# Patient Record
Sex: MALE | Race: WHITE | NOT HISPANIC OR LATINO | Employment: OTHER | URBAN - METROPOLITAN AREA
[De-identification: names, ages, dates, MRNs, and addresses within clinical notes are randomized per-mention and may not be internally consistent; named-entity substitution may affect disease eponyms.]

---

## 2018-03-01 ENCOUNTER — PES CALL (OUTPATIENT)
Dept: ADMINISTRATIVE | Facility: CLINIC | Age: 68
End: 2018-03-01

## 2018-08-21 ENCOUNTER — PES CALL (OUTPATIENT)
Dept: ADMINISTRATIVE | Facility: CLINIC | Age: 68
End: 2018-08-21

## 2020-03-09 ENCOUNTER — PES CALL (OUTPATIENT)
Dept: ADMINISTRATIVE | Facility: CLINIC | Age: 70
End: 2020-03-09

## 2020-11-27 ENCOUNTER — PES CALL (OUTPATIENT)
Dept: ADMINISTRATIVE | Facility: CLINIC | Age: 70
End: 2020-11-27

## 2020-11-30 ENCOUNTER — PES CALL (OUTPATIENT)
Dept: ADMINISTRATIVE | Facility: CLINIC | Age: 70
End: 2020-11-30

## 2021-02-27 ENCOUNTER — IMMUNIZATION (OUTPATIENT)
Dept: INTERNAL MEDICINE | Facility: CLINIC | Age: 71
End: 2021-02-27
Payer: MEDICARE

## 2021-02-27 DIAGNOSIS — Z23 NEED FOR VACCINATION: Primary | ICD-10-CM

## 2021-02-27 PROCEDURE — 91300 COVID-19, MRNA, LNP-S, PF, 30 MCG/0.3 ML DOSE VACCINE: CPT | Mod: PBBFAC | Performed by: FAMILY MEDICINE

## 2021-03-20 ENCOUNTER — IMMUNIZATION (OUTPATIENT)
Dept: INTERNAL MEDICINE | Facility: CLINIC | Age: 71
End: 2021-03-20
Payer: MEDICARE

## 2021-03-20 DIAGNOSIS — Z23 NEED FOR VACCINATION: Primary | ICD-10-CM

## 2021-03-20 PROCEDURE — 0002A COVID-19, MRNA, LNP-S, PF, 30 MCG/0.3 ML DOSE VACCINE: CPT | Mod: PBBFAC | Performed by: FAMILY MEDICINE

## 2021-03-20 PROCEDURE — 91300 COVID-19, MRNA, LNP-S, PF, 30 MCG/0.3 ML DOSE VACCINE: CPT | Mod: PBBFAC | Performed by: FAMILY MEDICINE

## 2021-12-09 ENCOUNTER — IMMUNIZATION (OUTPATIENT)
Dept: INTERNAL MEDICINE | Facility: CLINIC | Age: 71
End: 2021-12-09
Payer: MEDICARE

## 2021-12-09 DIAGNOSIS — Z23 NEED FOR VACCINATION: Primary | ICD-10-CM

## 2021-12-09 PROCEDURE — 0004A COVID-19, MRNA, LNP-S, PF, 30 MCG/0.3 ML DOSE VACCINE: CPT | Mod: HCNC,PBBFAC | Performed by: FAMILY MEDICINE

## 2023-08-22 ENCOUNTER — PES CALL (OUTPATIENT)
Dept: ADMINISTRATIVE | Facility: CLINIC | Age: 73
End: 2023-08-22
Payer: MEDICARE

## 2024-01-07 ENCOUNTER — HOSPITAL ENCOUNTER (INPATIENT)
Facility: HOSPITAL | Age: 74
LOS: 3 days | Discharge: HOME | DRG: 871 | End: 2024-01-10
Attending: EMERGENCY MEDICINE | Admitting: HOSPITALIST
Payer: MEDICARE

## 2024-01-07 DIAGNOSIS — E87.20 LACTIC ACIDOSIS: ICD-10-CM

## 2024-01-07 DIAGNOSIS — M89.8X9 BONE MASS: ICD-10-CM

## 2024-01-07 DIAGNOSIS — K29.60 EMPHYSEMATOUS GASTRITIS: ICD-10-CM

## 2024-01-07 DIAGNOSIS — K85.90 ACUTE PANCREATITIS, UNSPECIFIED COMPLICATION STATUS, UNSPECIFIED PANCREATITIS TYPE: ICD-10-CM

## 2024-01-07 DIAGNOSIS — C16.9 MALIGNANT NEOPLASM OF STOMACH, UNSPECIFIED LOCATION: Primary | ICD-10-CM

## 2024-01-07 DIAGNOSIS — K25.0 ACUTE GASTRIC ULCER WITH HEMORRHAGE: ICD-10-CM

## 2024-01-07 DIAGNOSIS — R06.02 SHORTNESS OF BREATH: ICD-10-CM

## 2024-01-07 DIAGNOSIS — R55 SYNCOPE, UNSPECIFIED SYNCOPE TYPE: ICD-10-CM

## 2024-01-07 DIAGNOSIS — K92.0 HEMATEMESIS WITH NAUSEA: ICD-10-CM

## 2024-01-07 DIAGNOSIS — J43.2 CENTRILOBULAR EMPHYSEMA: ICD-10-CM

## 2024-01-07 DIAGNOSIS — Z72.0 TOBACCO ABUSE: ICD-10-CM

## 2024-01-07 DIAGNOSIS — D64.9 ANEMIA, UNSPECIFIED TYPE: ICD-10-CM

## 2024-01-07 PROBLEM — A41.9 SEPSIS: Status: ACTIVE | Noted: 2024-01-07

## 2024-01-07 PROBLEM — R91.8 LUNG NODULES: Status: ACTIVE | Noted: 2024-01-07

## 2024-01-07 PROBLEM — R63.4 WEIGHT LOSS: Status: ACTIVE | Noted: 2024-01-07

## 2024-01-07 PROBLEM — J43.9 EMPHYSEMA LUNG: Status: ACTIVE | Noted: 2024-01-07

## 2024-01-07 PROBLEM — R73.9 HYPERGLYCEMIA: Status: ACTIVE | Noted: 2024-01-07

## 2024-01-07 PROBLEM — I95.9 HYPOTENSION: Status: ACTIVE | Noted: 2024-01-07

## 2024-01-07 LAB
ABO + RH BLD: NORMAL
ALBUMIN SERPL BCP-MCNC: 2.1 G/DL (ref 3.5–5.2)
ALBUMIN SERPL BCP-MCNC: 2.3 G/DL (ref 3.5–5.2)
ALLENS TEST: ABNORMAL
ALP SERPL-CCNC: 49 U/L (ref 55–135)
ALP SERPL-CCNC: 59 U/L (ref 55–135)
ALT SERPL W/O P-5'-P-CCNC: 10 U/L (ref 10–44)
ALT SERPL W/O P-5'-P-CCNC: 5 U/L (ref 10–44)
AMPHET+METHAMPHET UR QL: NEGATIVE
ANION GAP SERPL CALC-SCNC: 19 MMOL/L (ref 8–16)
ANION GAP SERPL CALC-SCNC: 7 MMOL/L (ref 8–16)
AST SERPL-CCNC: 10 U/L (ref 10–40)
AST SERPL-CCNC: 14 U/L (ref 10–40)
BARBITURATES UR QL SCN>200 NG/ML: NEGATIVE
BASOPHILS # BLD AUTO: 0.03 K/UL (ref 0–0.2)
BASOPHILS # BLD AUTO: 0.04 K/UL (ref 0–0.2)
BASOPHILS # BLD AUTO: 0.04 K/UL (ref 0–0.2)
BASOPHILS NFR BLD: 0.2 % (ref 0–1.9)
BASOPHILS NFR BLD: 0.2 % (ref 0–1.9)
BASOPHILS NFR BLD: 0.3 % (ref 0–1.9)
BENZODIAZ UR QL SCN>200 NG/ML: NEGATIVE
BILIRUB SERPL-MCNC: 0.2 MG/DL (ref 0.1–1)
BILIRUB SERPL-MCNC: 0.3 MG/DL (ref 0.1–1)
BILIRUB UR QL STRIP: NEGATIVE
BLD GP AB SCN CELLS X3 SERPL QL: NORMAL
BNP SERPL-MCNC: 26 PG/ML (ref 0–99)
BUN SERPL-MCNC: 25 MG/DL (ref 8–23)
BUN SERPL-MCNC: 36 MG/DL (ref 8–23)
BZE UR QL SCN: NEGATIVE
CALCIUM SERPL-MCNC: 8.1 MG/DL (ref 8.7–10.5)
CALCIUM SERPL-MCNC: 8.6 MG/DL (ref 8.7–10.5)
CANCER AG19-9 SERPL-ACNC: 5.8 U/ML (ref 0–40)
CANNABINOIDS UR QL SCN: NEGATIVE
CHLORIDE SERPL-SCNC: 103 MMOL/L (ref 95–110)
CHLORIDE SERPL-SCNC: 106 MMOL/L (ref 95–110)
CK SERPL-CCNC: 23 U/L (ref 20–200)
CLARITY UR: CLEAR
CO2 SERPL-SCNC: 13 MMOL/L (ref 23–29)
CO2 SERPL-SCNC: 19 MMOL/L (ref 23–29)
COLOR UR: YELLOW
CREAT SERPL-MCNC: 0.9 MG/DL (ref 0.5–1.4)
CREAT SERPL-MCNC: 1.2 MG/DL (ref 0.5–1.4)
CREAT UR-MCNC: 62.8 MG/DL (ref 23–375)
CTP QC/QA: YES
CTP QC/QA: YES
D DIMER PPP IA.FEU-MCNC: 3.51 MG/L FEU
DIFFERENTIAL METHOD BLD: ABNORMAL
EOSINOPHIL # BLD AUTO: 0 K/UL (ref 0–0.5)
EOSINOPHIL # BLD AUTO: 0 K/UL (ref 0–0.5)
EOSINOPHIL # BLD AUTO: 0.1 K/UL (ref 0–0.5)
EOSINOPHIL NFR BLD: 0.1 % (ref 0–8)
EOSINOPHIL NFR BLD: 0.1 % (ref 0–8)
EOSINOPHIL NFR BLD: 0.7 % (ref 0–8)
ERYTHROCYTE [DISTWIDTH] IN BLOOD BY AUTOMATED COUNT: 15.9 % (ref 11.5–14.5)
EST. GFR  (NO RACE VARIABLE): >60 ML/MIN/1.73 M^2
EST. GFR  (NO RACE VARIABLE): >60 ML/MIN/1.73 M^2
ETHANOL SERPL-MCNC: <10 MG/DL
FERRITIN SERPL-MCNC: 121 NG/ML (ref 20–300)
FIO2: 100 %
FOLATE SERPL-MCNC: 2.2 NG/ML (ref 4–24)
GLUCOSE SERPL-MCNC: 115 MG/DL (ref 70–110)
GLUCOSE SERPL-MCNC: 286 MG/DL (ref 70–110)
GLUCOSE UR QL STRIP: NEGATIVE
HCT VFR BLD AUTO: 24.9 % (ref 40–54)
HCT VFR BLD AUTO: 27.7 % (ref 40–54)
HCT VFR BLD AUTO: 30.6 % (ref 40–54)
HGB BLD-MCNC: 10 G/DL (ref 14–18)
HGB BLD-MCNC: 8.2 G/DL (ref 14–18)
HGB BLD-MCNC: 9.2 G/DL (ref 14–18)
HGB UR QL STRIP: NEGATIVE
IMM GRANULOCYTES # BLD AUTO: 0.1 K/UL (ref 0–0.04)
IMM GRANULOCYTES # BLD AUTO: 0.16 K/UL (ref 0–0.04)
IMM GRANULOCYTES # BLD AUTO: 0.36 K/UL (ref 0–0.04)
IMM GRANULOCYTES NFR BLD AUTO: 0.7 % (ref 0–0.5)
IMM GRANULOCYTES NFR BLD AUTO: 0.9 % (ref 0–0.5)
IMM GRANULOCYTES NFR BLD AUTO: 2.7 % (ref 0–0.5)
IRON SERPL-MCNC: 70 UG/DL (ref 45–160)
KETONES UR QL STRIP: NEGATIVE
LACTATE SERPL-SCNC: 2.1 MMOL/L (ref 0.5–2.2)
LACTATE SERPL-SCNC: 2.7 MMOL/L (ref 0.5–2.2)
LACTATE SERPL-SCNC: 9.9 MMOL/L (ref 0.5–2.2)
LEUKOCYTE ESTERASE UR QL STRIP: NEGATIVE
LIPASE SERPL-CCNC: 38 U/L (ref 4–60)
LPM: 15
LYMPHOCYTES # BLD AUTO: 1.2 K/UL (ref 1–4.8)
LYMPHOCYTES # BLD AUTO: 2.1 K/UL (ref 1–4.8)
LYMPHOCYTES # BLD AUTO: 3.4 K/UL (ref 1–4.8)
LYMPHOCYTES NFR BLD: 15.1 % (ref 18–48)
LYMPHOCYTES NFR BLD: 25 % (ref 18–48)
LYMPHOCYTES NFR BLD: 6.4 % (ref 18–48)
MAGNESIUM SERPL-MCNC: 2.2 MG/DL (ref 1.6–2.6)
MCH RBC QN AUTO: 30.3 PG (ref 27–31)
MCH RBC QN AUTO: 30.4 PG (ref 27–31)
MCH RBC QN AUTO: 30.4 PG (ref 27–31)
MCHC RBC AUTO-ENTMCNC: 32.7 G/DL (ref 32–36)
MCHC RBC AUTO-ENTMCNC: 32.9 G/DL (ref 32–36)
MCHC RBC AUTO-ENTMCNC: 33.2 G/DL (ref 32–36)
MCV RBC AUTO: 91 FL (ref 82–98)
MCV RBC AUTO: 92 FL (ref 82–98)
MCV RBC AUTO: 93 FL (ref 82–98)
METHADONE UR QL SCN>300 NG/ML: NEGATIVE
MONOCYTES # BLD AUTO: 0.7 K/UL (ref 0.3–1)
MONOCYTES # BLD AUTO: 0.9 K/UL (ref 0.3–1)
MONOCYTES # BLD AUTO: 0.9 K/UL (ref 0.3–1)
MONOCYTES NFR BLD: 4.6 % (ref 4–15)
MONOCYTES NFR BLD: 5.1 % (ref 4–15)
MONOCYTES NFR BLD: 6.4 % (ref 4–15)
NEUTROPHILS # BLD AUTO: 10.7 K/UL (ref 1.8–7.7)
NEUTROPHILS # BLD AUTO: 16.2 K/UL (ref 1.8–7.7)
NEUTROPHILS # BLD AUTO: 8.8 K/UL (ref 1.8–7.7)
NEUTROPHILS NFR BLD: 64.9 % (ref 38–73)
NEUTROPHILS NFR BLD: 78.8 % (ref 38–73)
NEUTROPHILS NFR BLD: 87.8 % (ref 38–73)
NITRITE UR QL STRIP: NEGATIVE
NRBC BLD-RTO: 0 /100 WBC
OPIATES UR QL SCN: NEGATIVE
PCO2 BLDA: 31.5 MMHG (ref 35–45)
PCP UR QL SCN>25 NG/ML: NEGATIVE
PH SMN: 7.31 [PH] (ref 7.35–7.45)
PH UR STRIP: 6 [PH] (ref 5–8)
PLATELET # BLD AUTO: 257 K/UL (ref 150–450)
PLATELET # BLD AUTO: 261 K/UL (ref 150–450)
PLATELET # BLD AUTO: 343 K/UL (ref 150–450)
PMV BLD AUTO: 9.2 FL (ref 9.2–12.9)
PMV BLD AUTO: 9.3 FL (ref 9.2–12.9)
PMV BLD AUTO: 9.4 FL (ref 9.2–12.9)
PO2 BLDA: 292 MMHG (ref 80–100)
POC BASE DEFICIT: -9.5 MMOL/L (ref -2–2)
POC HCO3: 15.8 MMOL/L (ref 24–28)
POC MOLECULAR INFLUENZA A AGN: NEGATIVE
POC MOLECULAR INFLUENZA B AGN: NEGATIVE
POC PERFORMED BY: ABNORMAL
POC SATURATED O2: 100 % (ref 95–100)
POCT GLUCOSE: 100 MG/DL (ref 70–110)
POCT GLUCOSE: 117 MG/DL (ref 70–110)
POTASSIUM SERPL-SCNC: 4.1 MMOL/L (ref 3.5–5.1)
POTASSIUM SERPL-SCNC: 5.1 MMOL/L (ref 3.5–5.1)
PROCALCITONIN SERPL IA-MCNC: 0.07 NG/ML
PROT SERPL-MCNC: 4.3 G/DL (ref 6–8.4)
PROT SERPL-MCNC: 4.9 G/DL (ref 6–8.4)
PROT UR QL STRIP: ABNORMAL
RBC # BLD AUTO: 2.7 M/UL (ref 4.6–6.2)
RBC # BLD AUTO: 3.04 M/UL (ref 4.6–6.2)
RBC # BLD AUTO: 3.29 M/UL (ref 4.6–6.2)
SARS-COV-2 RDRP RESP QL NAA+PROBE: NEGATIVE
SATURATED IRON: 31 % (ref 20–50)
SODIUM SERPL-SCNC: 132 MMOL/L (ref 136–145)
SODIUM SERPL-SCNC: 135 MMOL/L (ref 136–145)
SP GR UR STRIP: >1.03 (ref 1–1.03)
SPECIMEN OUTDATE: NORMAL
SPECIMEN SOURCE: ABNORMAL
TOTAL IRON BINDING CAPACITY: 223 UG/DL (ref 250–450)
TOXICOLOGY INFORMATION: NORMAL
TRANSFERRIN SERPL-MCNC: 151 MG/DL (ref 200–375)
TROPONIN I SERPL DL<=0.01 NG/ML-MCNC: 0.02 NG/ML (ref 0–0.03)
TSH SERPL DL<=0.005 MIU/L-ACNC: 2.37 UIU/ML (ref 0.4–4)
URN SPEC COLLECT METH UR: ABNORMAL
UROBILINOGEN UR STRIP-ACNC: NEGATIVE EU/DL
VIT B12 SERPL-MCNC: 229 PG/ML (ref 210–950)
WBC # BLD AUTO: 13.53 K/UL (ref 3.9–12.7)
WBC # BLD AUTO: 13.64 K/UL (ref 3.9–12.7)
WBC # BLD AUTO: 18.45 K/UL (ref 3.9–12.7)

## 2024-01-07 PROCEDURE — 83605 ASSAY OF LACTIC ACID: CPT | Mod: 91,HCNC | Performed by: EMERGENCY MEDICINE

## 2024-01-07 PROCEDURE — 25000003 PHARM REV CODE 250: Mod: HCNC | Performed by: EMERGENCY MEDICINE

## 2024-01-07 PROCEDURE — 99900035 HC TECH TIME PER 15 MIN (STAT): Mod: HCNC

## 2024-01-07 PROCEDURE — C9113 INJ PANTOPRAZOLE SODIUM, VIA: HCPCS | Mod: HCNC | Performed by: HOSPITALIST

## 2024-01-07 PROCEDURE — 83605 ASSAY OF LACTIC ACID: CPT | Mod: 91,HCNC | Performed by: STUDENT IN AN ORGANIZED HEALTH CARE EDUCATION/TRAINING PROGRAM

## 2024-01-07 PROCEDURE — 25000003 PHARM REV CODE 250: Mod: HCNC | Performed by: HOSPITALIST

## 2024-01-07 PROCEDURE — C9113 INJ PANTOPRAZOLE SODIUM, VIA: HCPCS | Mod: HCNC | Performed by: STUDENT IN AN ORGANIZED HEALTH CARE EDUCATION/TRAINING PROGRAM

## 2024-01-07 PROCEDURE — 96365 THER/PROPH/DIAG IV INF INIT: CPT | Mod: HCNC

## 2024-01-07 PROCEDURE — P9021 RED BLOOD CELLS UNIT: HCPCS | Mod: HCNC | Performed by: STUDENT IN AN ORGANIZED HEALTH CARE EDUCATION/TRAINING PROGRAM

## 2024-01-07 PROCEDURE — 93005 ELECTROCARDIOGRAM TRACING: CPT | Mod: HCNC

## 2024-01-07 PROCEDURE — 84484 ASSAY OF TROPONIN QUANT: CPT | Mod: HCNC | Performed by: EMERGENCY MEDICINE

## 2024-01-07 PROCEDURE — 80053 COMPREHEN METABOLIC PANEL: CPT | Mod: 91,HCNC | Performed by: STUDENT IN AN ORGANIZED HEALTH CARE EDUCATION/TRAINING PROGRAM

## 2024-01-07 PROCEDURE — 93010 ELECTROCARDIOGRAM REPORT: CPT | Mod: HCNC,,, | Performed by: INTERNAL MEDICINE

## 2024-01-07 PROCEDURE — 36600 WITHDRAWAL OF ARTERIAL BLOOD: CPT | Mod: HCNC

## 2024-01-07 PROCEDURE — 86920 COMPATIBILITY TEST SPIN: CPT | Mod: HCNC | Performed by: STUDENT IN AN ORGANIZED HEALTH CARE EDUCATION/TRAINING PROGRAM

## 2024-01-07 PROCEDURE — 86301 IMMUNOASSAY TUMOR CA 19-9: CPT | Mod: HCNC | Performed by: STUDENT IN AN ORGANIZED HEALTH CARE EDUCATION/TRAINING PROGRAM

## 2024-01-07 PROCEDURE — 83880 ASSAY OF NATRIURETIC PEPTIDE: CPT | Mod: HCNC | Performed by: EMERGENCY MEDICINE

## 2024-01-07 PROCEDURE — 63600175 PHARM REV CODE 636 W HCPCS: Mod: HCNC | Performed by: HOSPITALIST

## 2024-01-07 PROCEDURE — 36415 COLL VENOUS BLD VENIPUNCTURE: CPT | Mod: HCNC | Performed by: STUDENT IN AN ORGANIZED HEALTH CARE EDUCATION/TRAINING PROGRAM

## 2024-01-07 PROCEDURE — 25500020 PHARM REV CODE 255: Mod: HCNC | Performed by: EMERGENCY MEDICINE

## 2024-01-07 PROCEDURE — 87635 SARS-COV-2 COVID-19 AMP PRB: CPT | Mod: HCNC | Performed by: EMERGENCY MEDICINE

## 2024-01-07 PROCEDURE — 87040 BLOOD CULTURE FOR BACTERIA: CPT | Mod: HCNC | Performed by: EMERGENCY MEDICINE

## 2024-01-07 PROCEDURE — 80053 COMPREHEN METABOLIC PANEL: CPT | Mod: HCNC | Performed by: EMERGENCY MEDICINE

## 2024-01-07 PROCEDURE — 86850 RBC ANTIBODY SCREEN: CPT | Mod: HCNC | Performed by: HOSPITALIST

## 2024-01-07 PROCEDURE — 83690 ASSAY OF LIPASE: CPT | Mod: HCNC | Performed by: EMERGENCY MEDICINE

## 2024-01-07 PROCEDURE — A9698 NON-RAD CONTRAST MATERIALNOC: HCPCS | Mod: HCNC | Performed by: EMERGENCY MEDICINE

## 2024-01-07 PROCEDURE — 82803 BLOOD GASES ANY COMBINATION: CPT | Mod: HCNC

## 2024-01-07 PROCEDURE — 82746 ASSAY OF FOLIC ACID SERUM: CPT | Mod: HCNC | Performed by: STUDENT IN AN ORGANIZED HEALTH CARE EDUCATION/TRAINING PROGRAM

## 2024-01-07 PROCEDURE — 96361 HYDRATE IV INFUSION ADD-ON: CPT | Mod: HCNC

## 2024-01-07 PROCEDURE — 84145 PROCALCITONIN (PCT): CPT | Mod: HCNC | Performed by: EMERGENCY MEDICINE

## 2024-01-07 PROCEDURE — 82550 ASSAY OF CK (CPK): CPT | Mod: HCNC | Performed by: EMERGENCY MEDICINE

## 2024-01-07 PROCEDURE — 63600175 PHARM REV CODE 636 W HCPCS: Mod: HCNC | Performed by: STUDENT IN AN ORGANIZED HEALTH CARE EDUCATION/TRAINING PROGRAM

## 2024-01-07 PROCEDURE — 63600175 PHARM REV CODE 636 W HCPCS: Mod: HCNC | Performed by: EMERGENCY MEDICINE

## 2024-01-07 PROCEDURE — 94761 N-INVAS EAR/PLS OXIMETRY MLT: CPT | Mod: HCNC,XB

## 2024-01-07 PROCEDURE — 27000221 HC OXYGEN, UP TO 24 HOURS: Mod: HCNC

## 2024-01-07 PROCEDURE — 99223 1ST HOSP IP/OBS HIGH 75: CPT | Mod: HCNC,,, | Performed by: INTERNAL MEDICINE

## 2024-01-07 PROCEDURE — 94640 AIRWAY INHALATION TREATMENT: CPT | Mod: HCNC

## 2024-01-07 PROCEDURE — 85025 COMPLETE CBC W/AUTO DIFF WBC: CPT | Mod: 91,HCNC | Performed by: HOSPITALIST

## 2024-01-07 PROCEDURE — 85379 FIBRIN DEGRADATION QUANT: CPT | Mod: HCNC | Performed by: EMERGENCY MEDICINE

## 2024-01-07 PROCEDURE — 99285 EMERGENCY DEPT VISIT HI MDM: CPT | Mod: 25,HCNC

## 2024-01-07 PROCEDURE — 83540 ASSAY OF IRON: CPT | Mod: HCNC | Performed by: STUDENT IN AN ORGANIZED HEALTH CARE EDUCATION/TRAINING PROGRAM

## 2024-01-07 PROCEDURE — 83735 ASSAY OF MAGNESIUM: CPT | Mod: HCNC | Performed by: EMERGENCY MEDICINE

## 2024-01-07 PROCEDURE — 82077 ASSAY SPEC XCP UR&BREATH IA: CPT | Mod: HCNC | Performed by: EMERGENCY MEDICINE

## 2024-01-07 PROCEDURE — 80307 DRUG TEST PRSMV CHEM ANLYZR: CPT | Mod: HCNC | Performed by: EMERGENCY MEDICINE

## 2024-01-07 PROCEDURE — 82728 ASSAY OF FERRITIN: CPT | Mod: HCNC | Performed by: STUDENT IN AN ORGANIZED HEALTH CARE EDUCATION/TRAINING PROGRAM

## 2024-01-07 PROCEDURE — 84443 ASSAY THYROID STIM HORMONE: CPT | Mod: HCNC | Performed by: EMERGENCY MEDICINE

## 2024-01-07 PROCEDURE — 25000242 PHARM REV CODE 250 ALT 637 W/ HCPCS: Mod: HCNC | Performed by: HOSPITALIST

## 2024-01-07 PROCEDURE — 20000000 HC ICU ROOM: Mod: HCNC

## 2024-01-07 PROCEDURE — 81003 URINALYSIS AUTO W/O SCOPE: CPT | Mod: HCNC,59 | Performed by: EMERGENCY MEDICINE

## 2024-01-07 PROCEDURE — 82607 VITAMIN B-12: CPT | Mod: HCNC | Performed by: STUDENT IN AN ORGANIZED HEALTH CARE EDUCATION/TRAINING PROGRAM

## 2024-01-07 PROCEDURE — 85025 COMPLETE CBC W/AUTO DIFF WBC: CPT | Mod: HCNC | Performed by: EMERGENCY MEDICINE

## 2024-01-07 PROCEDURE — 36430 TRANSFUSION BLD/BLD COMPNT: CPT | Mod: HCNC

## 2024-01-07 RX ORDER — HYDROCODONE BITARTRATE AND ACETAMINOPHEN 500; 5 MG/1; MG/1
TABLET ORAL
Status: DISCONTINUED | OUTPATIENT
Start: 2024-01-07 | End: 2024-01-10 | Stop reason: HOSPADM

## 2024-01-07 RX ORDER — IBUPROFEN 200 MG
200 TABLET ORAL EVERY 6 HOURS PRN
Status: ON HOLD | COMMUNITY
End: 2024-01-10 | Stop reason: HOSPADM

## 2024-01-07 RX ORDER — IPRATROPIUM BROMIDE AND ALBUTEROL SULFATE 2.5; .5 MG/3ML; MG/3ML
3 SOLUTION RESPIRATORY (INHALATION)
Status: DISPENSED | OUTPATIENT
Start: 2024-01-07 | End: 2024-01-09

## 2024-01-07 RX ORDER — SODIUM CHLORIDE 9 MG/ML
INJECTION, SOLUTION INTRAVENOUS
Status: DISCONTINUED | OUTPATIENT
Start: 2024-01-07 | End: 2024-01-10 | Stop reason: HOSPADM

## 2024-01-07 RX ORDER — GLUCAGON 1 MG
1 KIT INJECTION
Status: DISCONTINUED | OUTPATIENT
Start: 2024-01-07 | End: 2024-01-09

## 2024-01-07 RX ORDER — INSULIN ASPART 100 [IU]/ML
0-5 INJECTION, SOLUTION INTRAVENOUS; SUBCUTANEOUS EVERY 6 HOURS PRN
Status: DISCONTINUED | OUTPATIENT
Start: 2024-01-07 | End: 2024-01-09

## 2024-01-07 RX ORDER — PANTOPRAZOLE SODIUM 40 MG/10ML
40 INJECTION, POWDER, LYOPHILIZED, FOR SOLUTION INTRAVENOUS 2 TIMES DAILY
Status: DISCONTINUED | OUTPATIENT
Start: 2024-01-07 | End: 2024-01-10 | Stop reason: HOSPADM

## 2024-01-07 RX ORDER — SODIUM CHLORIDE 9 MG/ML
1000 INJECTION, SOLUTION INTRAVENOUS
Status: COMPLETED | OUTPATIENT
Start: 2024-01-07 | End: 2024-01-07

## 2024-01-07 RX ORDER — IBUPROFEN 200 MG
1 TABLET ORAL DAILY
Status: DISCONTINUED | OUTPATIENT
Start: 2024-01-07 | End: 2024-01-10 | Stop reason: HOSPADM

## 2024-01-07 RX ORDER — PANTOPRAZOLE SODIUM 40 MG/10ML
40 INJECTION, POWDER, LYOPHILIZED, FOR SOLUTION INTRAVENOUS ONCE
Status: COMPLETED | OUTPATIENT
Start: 2024-01-07 | End: 2024-01-07

## 2024-01-07 RX ADMIN — PANTOPRAZOLE SODIUM 40 MG: 40 INJECTION, POWDER, LYOPHILIZED, FOR SOLUTION INTRAVENOUS at 05:01

## 2024-01-07 RX ADMIN — VANCOMYCIN HYDROCHLORIDE 2250 MG: 500 INJECTION, POWDER, LYOPHILIZED, FOR SOLUTION INTRAVENOUS at 03:01

## 2024-01-07 RX ADMIN — PIPERACILLIN AND TAZOBACTAM 4.5 G: 4; .5 INJECTION, POWDER, LYOPHILIZED, FOR SOLUTION INTRAVENOUS; PARENTERAL at 11:01

## 2024-01-07 RX ADMIN — SODIUM CHLORIDE: 9 INJECTION, SOLUTION INTRAVENOUS at 03:01

## 2024-01-07 RX ADMIN — SODIUM CHLORIDE 1000 ML: 9 INJECTION, SOLUTION INTRAVENOUS at 09:01

## 2024-01-07 RX ADMIN — IOHEXOL 100 ML: 350 INJECTION, SOLUTION INTRAVENOUS at 10:01

## 2024-01-07 RX ADMIN — PANTOPRAZOLE SODIUM 40 MG: 40 INJECTION, POWDER, LYOPHILIZED, FOR SOLUTION INTRAVENOUS at 01:01

## 2024-01-07 RX ADMIN — PANTOPRAZOLE SODIUM 40 MG: 40 INJECTION, POWDER, LYOPHILIZED, FOR SOLUTION INTRAVENOUS at 10:01

## 2024-01-07 RX ADMIN — PIPERACILLIN AND TAZOBACTAM 4.5 G: 4; .5 INJECTION, POWDER, LYOPHILIZED, FOR SOLUTION INTRAVENOUS; PARENTERAL at 07:01

## 2024-01-07 RX ADMIN — IOHEXOL 75 ML: 350 INJECTION, SOLUTION INTRAVENOUS at 02:01

## 2024-01-07 RX ADMIN — IPRATROPIUM BROMIDE AND ALBUTEROL SULFATE 3 ML: 2.5; .5 SOLUTION RESPIRATORY (INHALATION) at 09:01

## 2024-01-07 RX ADMIN — IOHEXOL 1000 ML: 12 SOLUTION ORAL at 01:01

## 2024-01-07 RX ADMIN — IPRATROPIUM BROMIDE AND ALBUTEROL SULFATE 3 ML: 2.5; .5 SOLUTION RESPIRATORY (INHALATION) at 03:01

## 2024-01-07 RX ADMIN — SODIUM CHLORIDE, POTASSIUM CHLORIDE, SODIUM LACTATE AND CALCIUM CHLORIDE 2859 ML: 600; 310; 30; 20 INJECTION, SOLUTION INTRAVENOUS at 09:01

## 2024-01-07 NOTE — CONSULTS
[facility] Note    Primary Attending Physician: Dr. Hinojosa  ICU Attending: Dr. Vasquez    Reason for Consult:     Hypotension    Subjective:      History of Present Illness:  Pino Nance is a 73 y.o. male with no documented past medical history presenting to the ICU with hypotension, generalized weakness, and abdominal pain. The patient presented to the [facility] ED on 1/7/2024 after experiencing profound weakness and had an episode of hematemesis while having a loose bowel movement earlier today. Per EMS, his BP in the field was 74/40 and his O2 saturation was 82% on non-rebreather. He has not had a primary care provider in the last 40 years.     He began experiencing constant diffuse abdominal pain 3-4 months ago along with a bad taste in his mouth and subsequent loss of appetite. Additionally, he has also experience a 50 lb weight loss over the course of a few months. He denies any prior episodes of hematemesis before today.    The patient has a 116 pack/year (2 pack per day) smoking history. He decreased to 1PPD last year. Currently smokes 1 pack per day. He reports chronic SOB and coughs up brown sputum daily. He denies any use of alcohol, marijuana, cocaine or opiates. Notes no use of NSAIDS.    Past Medical History:  History reviewed. No pertinent past medical history.    Past Surgical History:  Appendectomy at 9 y/o    Allergies:  Review of patient's allergies indicates:  No Known Allergies    Medications:   In-Hospital Scheduled Medications:   albuterol-ipratropium  3 mL Nebulization Q6H WAKE    nicotine  1 patch Transdermal Daily    pantoprazole  40 mg Intravenous BID    piperacillin-tazobactam (Zosyn) IV (PEDS and ADULTS) (extended infusion is not appropriate)  4.5 g Intravenous Q8H    vancomycin (VANCOCIN) IV (PEDS and ADULTS)  2,250 mg Intravenous Once    [START ON 1/8/2024] vancomycin (VANCOCIN) IV (PEDS and ADULTS)  2,500 mg Intravenous Q24H      In-Hospital PRN  "Medications:  sodium chloride 0.9%, dextrose 10%, dextrose 10%, glucagon (human recombinant), insulin aspart U-100, Pharmacy to dose Vancomycin consult **AND** vancomycin - pharmacy to dose   In-Hospital IV Infusion Medications:     Home Medications:  Prior to Admission medications    Medication Sig Start Date End Date Taking? Authorizing Provider   ibuprofen (ADVIL,MOTRIN) 200 MG tablet Take 200 mg by mouth every 6 (six) hours as needed for Pain.   Yes Provider, Historical       Family History:  Family History   Family history unknown: Yes       Social History:  Social History     Tobacco Use    Smoking status: Every Day     Current packs/day: 1.00     Average packs/day: 2 pack/day for 58.0 years (116.0 ttl pk-yrs)     Types: Cigarettes     Start date:    Substance Use Topics    Alcohol use: Not Currently    Drug use: Never       Review of Systems:  Constitutional: negative  Eyes: negative  Respiratory: positive for SOB  Cardiovascular: negative  Gastrointestinal: positive for abdominal pain, diarrhea, nausea, reflux symptoms, and vomiting  Neurological: negative All other systems are reviewed and are negative.     Objective:   Last 24 Hour Vital Signs:  BP  Min: 74/51  Max: 126/50  Temp  Av.4 °F (36.3 °C)  Min: 96.4 °F (35.8 °C)  Max: 98.1 °F (36.7 °C)  Pulse  Av.4  Min: 78  Max: 102  Resp  Av.1  Min: 16  Max: 36  SpO2  Av.2 %  Min: 90 %  Max: 100 %  Height  Av' 11.5" (181.6 cm)  Min: 5' 11" (180.3 cm)  Max: 6' (182.9 cm)  Weight  Av.6 kg (204 lb 3.3 oz)  Min: 90 kg (198 lb 6.6 oz)  Max: 95.3 kg (210 lb)  No intake/output data recorded.    Physical Examination:  BP (!) 93/56   Pulse 79   Temp 98.1 °F (36.7 °C) (Oral)   Resp (!) 28   Ht 5' 11" (1.803 m)   Wt 90 kg (198 lb 6.6 oz)   SpO2 98%   BMI 27.67 kg/m²   General appearance: alert, appears stated age, and cooperative  Head: Normocephalic, without obvious abnormality, atraumatic  Eyes: conjunctivae/corneas clear. PERRL, " "EOM's intact  Neck: no JVD and supple, symmetrical, trachea midline  Back: no kyphosis present, no scoliosis present  Lungs:  coarse breath sounds bilaterally  Chest wall: no tenderness, barrel chested  Heart: regular rate and rhythm  Abdomen: abnormal findings:  diffuse tenderness to palpation mainly in the epigastric regions  Extremities: extremities normal, atraumatic, no cyanosis or edema      Laboratory:  Trended Lab Data:  Recent Labs     01/07/24 0928 01/07/24  1323   WBC 13.53* 18.45*   HGB 10.0* 9.2*   HCT 30.6* 27.7*    261   *  --    K 4.1  --      --    CO2 13*  --    BUN 25*  --    CREATININE 1.2  --    *  --    BILITOT 0.3  --    AST 10  --    ALT 5*  --    ALKPHOS 59  --    CALCIUM 8.6*  --    ALBUMIN 2.3*  --    PROT 4.9*  --    MG 2.2  --          Cardiac:   Recent Labs   Lab 01/07/24 0928   TROPONINI 0.016   BNP 26         Urinalysis: No results found for: "LABURIN", "COLORU", "CLARITYU", "SPECGRAV", "LABSPEC", "NITRITE", "PROTEINUR", "GLUCOSEU", "KETONESU", "UROBILINOGEN", "BILIRUBINUR", "BLOODU"    Microbiology:  Microbiology Results (last 7 days)       Procedure Component Value Units Date/Time    Blood Culture #1 **CANNOT BE ORDERED STAT** [[id number]] Collected: 01/07/24 0936    Order Status: Sent Specimen: Blood from Peripheral, Hand, Left Updated: 01/07/24 0936    Blood Culture #2 **CANNOT BE ORDERED STAT** [[id number]] Collected: 01/07/24 0930    Order Status: Sent Specimen: Blood from Peripheral, Antecubital, Right Updated: 01/07/24 0931            Radiology:  CXR: No acute findings    CT PE: Unremarkable    CT Chest: Ground glass appearance. Emphysematous changes in the bilateral lungs, most notably at the apices. Possible air in the stomach wall consistent with emphysematous gastritis. Notable stranding of the fat along the posterior in the inferior margin of the stomach that extends towards the pancreas.    CT Abdomen: Findings suggestive of a >5 cm lesser " curvature gastric ulcer with erosion into and inflammation of the adjacent pancreatic tail/pancreatitis.  Blood products are suspected within the gastric lumen. Aortic aneurysm dilation with eccentric mural thrombus.    I have personally reviewed the above labs and imaging.    Current Medications:     Infusions:       Scheduled:   albuterol-ipratropium  3 mL Nebulization Q6H WAKE    nicotine  1 patch Transdermal Daily    pantoprazole  40 mg Intravenous BID    piperacillin-tazobactam (Zosyn) IV (PEDS and ADULTS) (extended infusion is not appropriate)  4.5 g Intravenous Q8H    vancomycin (VANCOCIN) IV (PEDS and ADULTS)  2,250 mg Intravenous Once    [START ON 1/8/2024] vancomycin (VANCOCIN) IV (PEDS and ADULTS)  2,500 mg Intravenous Q24H        PRN:  sodium chloride 0.9%, dextrose 10%, dextrose 10%, glucagon (human recombinant), insulin aspart U-100, Pharmacy to dose Vancomycin consult **AND** vancomycin - pharmacy to dose     Assessment:     Pino Nance is a 73 y.o. male presenting with a case of hypotension and anion gap metabolic acidosis secondary to acute gastric ulcer bleed and possible pancreatitis in the setting of tobacco use disorder and possible gastric or pancreatic malignancy.     Plan:     Neuro:  No acute concerns  - AAOx3, cooperative and calm    Cardiovascular/Hemodynamics:  Hemorrhagic shock   Acute gastric ulcer bleed  Suspected sepsis  - CBC and imaging indicate a bleeding gastric ulcer as the likely source of his blood loss and resulting hypovolemia. Also likely responsible for elevated D-dimer. BP stabilized after fluid administration in the ED. All other vitals stable.  - Last ABG 7.309/31.5/15.8; anion gap = 23 (0900 1/7/24)  - Lactic acid level down trending 9.9 (0930) --> 2.7 (1323)  - WBC count elevated to 18.5 this afternoon.   - Blood cultures collected and started on vancomycin and zosyn.     Plan:  -EGD tomorrow to attempt bleeding control and evaluate the need for surgical  management  -Repeat Hgb. Typed and crossed.   - Goal of transfusion in hemorrhagic shock is reversal of hypoperfusion, not merely Hgb >7.  -Recommend de-escalation of antibiotics as no infectious source for MRSA is suspected, can de-escalate to zosyn alone.     Respiratory:   Probable COPD  Tobacco Use Disorder  - Patient's history of daily brown phlegm production, physical exam, emphysematous changes on CXR, and tobacco history are suggestive of newly diagnosed COPD.  - O2 saturations within normal limits at this time. Breathing at baseline effort.    Plan  - DuoNebs Q6  - PFTs, 6MWT on discharge   -  regarding smoking cessation    GI/FEN:  F: Euvolemic at this time  E: at goal; K>4, Mg>2  N: NPO for EGD. Optimize nutrition as soon as possible post-procedure.    Gastric ulcer bleed  Pancreatitis  Possible GI malignancy  - Gastric ulcer bleed seen on imaging. Meets 2/3 criteria for pancreatitis. Recent weight loss also concerning for possible gastric or pancreatic malignancy    Plan:  - EGD tomorrow  - NPO at this time. Optimize nutrition after EGD.    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 1.2, unknown basline  - Renally dose all medication, avoid nephrotoxic agents  - Strict I/Os, daily weights    Heme/Onc:   Possible gastric vs pancreatic malignancy   Symptomatic Anemia  - WBC 18.45, Hgb 9.2  - Biposy of gastric ulcer will be performed    Endocrine:  Hyperglycemia  - A1c pending  - SSI + Accuchecks  - Goal glucose 140-180 while in ICU    TSH: 2.372    Rheum/MSK:  No acute concerns     Critical Care Daily Checklist:    A: Awake: RASS Goal/Actual Goal: 0  Actual: Dickson Agitation Sedation Scale (RASS): 0   B: Spontaneous Breathing Trial Performed? N/A   C:                        D: Delirium: CAM-ICU Overall CAM-ICU: N/A   E: Early Mobility Performed? No    F: Feeding Goal: Regular diet  Status: NPO pre-procedure     AS: Analgesia/Sedation None   T: Thromboembolic Prophylaxis None   H: HOB > 300 Yes   U: Stress Ulcer Prophylaxis (if needed) N/A   G: Glucose Control SSI   B: Bowel Function Stool Occurrence: 1   I: Indwelling Catheter (Lines & [place]) Necessity No   D: De-escalation of Antimicrobials/Pharmacotherapies Yes    Plan for the day/ETD NPO overnight, EGD in the AM    Code Status:  Family/Goals of Care: Full code     Roro Goyal MD  [facility] EM/IM  [facility]

## 2024-01-07 NOTE — CONSULTS
[facility] - Intensive Care  Gastroenterology  Consult Note    Patient Name: Pino Nance  MRN: [id number]  Admission Date: 1/7/2024  Hospital Length of Stay: 0 days  Code Status: Full Code   Attending Provider: Davin Hinojosa,*   Consulting Provider: Kelly Joyce MD  Primary Care Physician: Karla Luevano MD  Principal Problem:Hypotension    [facility]  Consult performed by: Kelly Joyce MD  Consult ordered by: Davin Hinojosa MD  Reason for consult: Hematemesis, abnormal imaging GI  Assessment/Recommendations: Please see A/P below        Subjective:     HPI:  73-year-old man with tobacco use disorder who presents with episode of hematemesis and lightheadedness.  He states that he has not seen a doctor in almost 40 years and is on no home medications including NSAIDs (chart review shows colonoscopy for rectal bleeding with Dr. Harper 2012, as well as numerous visits at [facility] over the past 10 years for orthopedic issues) .  He states that over the past several months he has been having stomach pain associated with decreased appetite.  Also reports the food does not taste as good as normal and that he has had a bad taste in his mouth that stays there over this time.  States that he used to be a size 52 waist but that now his pants are no longer fitting.  He does not weigh himself so unsure how much weight he has lost.     History reviewed. No pertinent past medical history.    History reviewed. No pertinent surgical history.    Review of patient's allergies indicates:  No Known Allergies  Family History    Family history is unknown by patient.       Tobacco Use    Smoking status: Every Day     Current packs/day: 1.00     Average packs/day: 1 pack/day for 58.0 years (58.0 ttl pk-yrs)     Types: Cigarettes     Start date: 1966    Smokeless tobacco: Not on file   Substance and Sexual Activity    Alcohol use: Not Currently    Drug use: Never    Sexual  activity: Not on file     Review of Systems   Constitutional:  Positive for appetite change and unexpected weight change.   Eyes:  Negative for photophobia and visual disturbance.   Respiratory:  Negative for chest tightness, shortness of breath and wheezing.    Cardiovascular:  Negative for chest pain, palpitations and leg swelling.   Gastrointestinal:  Positive for abdominal pain and vomiting.   Genitourinary:  Negative for dysuria, flank pain and hematuria.   Musculoskeletal:  Negative for joint swelling and myalgias.   Skin:  Negative for color change and rash.   Neurological:  Negative for dizziness and speech difficulty.   Psychiatric/Behavioral:  Negative for confusion and hallucinations.      Objective:     Vital Signs (Most Recent):  Temp: 97.6 °F (36.4 °C) (01/07/24 1405)  Pulse: 82 (01/07/24 1415)  Resp: (!) 26 (01/07/24 1415)  BP: (!) 93/55 (01/07/24 1415)  SpO2: 99 % (01/07/24 1415) Vital Signs (24h Range):  Temp:  [96.4 °F (35.8 °C)-97.6 °F (36.4 °C)] 97.6 °F (36.4 °C)  Pulse:  [] 82  Resp:  [17-36] 26  SpO2:  [90 %-100 %] 99 %  BP: ()/(40-69) 93/55     Weight: 90 kg (198 lb 6.6 oz) (01/07/24 1405)  Body mass index is 27.67 kg/m².      Intake/Output Summary (Last 24 hours) at 1/7/2024 1453  Last data filed at 1/7/2024 1224  Gross per 24 hour   Intake 1101.38 ml   Output --   Net 1101.38 ml       Lines/Drains/Airways       Peripheral Intravenous Line  Duration                  Peripheral IV - Single Lumen 01/07/24 0945 20 G Anterior;Left Antecubital <1 day         Peripheral IV - Single Lumen 01/07/24 0949 20 G Right Antecubital <1 day         Peripheral IV - Single Lumen 01/07/24 0950 20 G Anterior;Right Hand <1 day                     Physical Exam  Constitutional:       Appearance: Normal appearance. He is well-developed. He is ill-appearing.   HENT:      Head: Normocephalic and atraumatic.      Mouth/Throat:      Comments: Very poor dentition  Eyes:      Extraocular Movements:  "Extraocular movements intact.      Pupils: Pupils are equal, round, and reactive to light.   Pulmonary:      Effort: Pulmonary effort is normal. No respiratory distress.   Abdominal:      General: There is no distension.      Palpations: Abdomen is soft.      Tenderness: There is no abdominal tenderness. There is no guarding or rebound.   Musculoskeletal:         General: No deformity. Normal range of motion.      Cervical back: Normal range of motion and neck supple.   Skin:     General: Skin is warm and dry.   Neurological:      General: No focal deficit present.      Mental Status: He is alert and oriented to person, place, and time.   Psychiatric:         Mood and Affect: Mood normal.         Behavior: Behavior normal.          Significant Labs:  Blood Culture: No results for input(s): "LABBLOO" in the last 48 hours.  CBC:   Recent Labs   Lab 01/07/24  0928 01/07/24  1323   WBC 13.53* 18.45*   HGB 10.0* 9.2*   HCT 30.6* 27.7*    261     BMP:   Recent Labs   Lab 01/07/24  0928   *   *   K 4.1      CO2 13*   BUN 25*   CREATININE 1.2   CALCIUM 8.6*   MG 2.2     CMP:   Recent Labs   Lab 01/07/24  0928   *   CALCIUM 8.6*   ALBUMIN 2.3*   PROT 4.9*   *   K 4.1   CO2 13*      BUN 25*   CREATININE 1.2   ALKPHOS 59   ALT 5*   AST 10   BILITOT 0.3     Coagulation: No results for input(s): "PT", "INR", "APTT" in the last 48 hours.    Significant Imaging:  Imaging results within the past 24 hours have been reviewed. Large mass in the proximal stomach.    Assessment/Plan:     GI  Hematemesis with nausea  - Large mass seen in stomach on CT scan.  Exam benign, no overt perforation or peritonitis  - NPO  - bid PPI  - Plan for EGD in am        Thank you for your consult. The GI team will follow-up with patient. Please contact us if you have any additional questions.    Kelly Joyce MD  Gastroenterology  [facility] - Intensive [facility]  "

## 2024-01-07 NOTE — MEDICAL/APP STUDENT
[facility] Note    Primary Attending Physician: Dr. Hinojosa  ICU Attending: Dr. Vasquez    Reason for Consult:     Hypotension    Subjective:      History of Present Illness:  Pino Nance is a 73 y.o. male with no documented past medical history presenting to the ICU with hypotension, generalized weakness, and abdominal pain. The patient presented to the [facility] ED on 1/7/2024 after experiencing profound weakness and had an episode of hematemesis while having a loose bowel movement earlier today. Per EMS, his BP in the field was 74/40 and his O2 saturation was 82% on non-rebreather. He has not seen a physician in the last 40 years.     He began experiencing constant diffuse abdominal pain 3-4 months ago along with a bad taste in his mouth and subsequent loss of appetite. Additionally, he has also experience a 50 lb weight loss over the course of a few months. He denies any prior episodes of hematemesis before today.    The patient has a 116 pack/year (2 pack per day) smoking history. He currently smoked 1 pack per day. He reports chronic SOB and coughs up brown sputum daily. He denies any alcohol, marijuana, cocaine or opiates.    Past Medical History:  History reviewed. No pertinent past medical history.    Past Surgical History:  Appendectomy at 11 y/o    Allergies:  Review of patient's allergies indicates:  No Known Allergies    Medications:   In-Hospital Scheduled Medications:   albuterol-ipratropium  3 mL Nebulization Q6H WAKE    nicotine  1 patch Transdermal Daily    pantoprazole  40 mg Intravenous BID    pantoprazole  40 mg Intravenous Once    piperacillin-tazobactam (Zosyn) IV (PEDS and ADULTS) (extended infusion is not appropriate)  4.5 g Intravenous Q8H    vancomycin (VANCOCIN) IV (PEDS and ADULTS)  2,250 mg Intravenous Once    [START ON 1/8/2024] vancomycin (VANCOCIN) IV (PEDS and ADULTS)  2,500 mg Intravenous Q24H      In-Hospital PRN Medications:  sodium chloride  "0.9%, dextrose 10%, dextrose 10%, glucagon (human recombinant), insulin aspart U-100, Pharmacy to dose Vancomycin consult **AND** vancomycin - pharmacy to dose   In-Hospital IV Infusion Medications:     Home Medications:  Prior to Admission medications    Medication Sig Start Date End Date Taking? Authorizing Provider   ibuprofen (ADVIL,MOTRIN) 200 MG tablet Take 200 mg by mouth every 6 (six) hours as needed for Pain.   Yes Provider, Historical       Family History:  Family History   Family history unknown: Yes       Social History:  Social History     Tobacco Use    Smoking status: Every Day     Current packs/day: 1.00     Average packs/day: 2 pack/day for 58.0 years (116.0 ttl pk-yrs)     Types: Cigarettes     Start date:    Substance Use Topics    Alcohol use: Not Currently    Drug use: Never       Review of Systems:  Constitutional: negative  Eyes: negative  Respiratory: positive for SOB  Cardiovascular: negative  Gastrointestinal: positive for abdominal pain, diarrhea, nausea, reflux symptoms, and vomiting  Neurological: negative All other systems are reviewed and are negative.     Objective:   Last 24 Hour Vital Signs:  BP  Min: 74/51  Max: 126/50  Temp  Av °F (36.1 °C)  Min: 96.4 °F (35.8 °C)  Max: 97.6 °F (36.4 °C)  Pulse  Av.2  Min: 78  Max: 102  Resp  Av.5  Min: 16  Max: 36  SpO2  Av.3 %  Min: 90 %  Max: 100 %  Height  Av' 11.5" (181.6 cm)  Min: 5' 11" (180.3 cm)  Max: 6' (182.9 cm)  Weight  Av.6 kg (204 lb 3.3 oz)  Min: 90 kg (198 lb 6.6 oz)  Max: 95.3 kg (210 lb)  No intake/output data recorded.    Physical Examination:  BP (!) 91/58   Pulse 79   Temp 97.6 °F (36.4 °C) (Oral)   Resp 19   Ht 5' 11" (1.803 m)   Wt 90 kg (198 lb 6.6 oz)   SpO2 (!) 93%   BMI 27.67 kg/m²   General appearance: alert, appears stated age, and cooperative  Head: Normocephalic, without obvious abnormality, atraumatic  Eyes: conjunctivae/corneas clear. PERRL, EOM's intact  Neck: no JVD and " "supple, symmetrical, trachea midline  Back: no kyphosis present, no scoliosis present  Lungs:  coarse breath sounds bilaterally  Chest wall: no tenderness, barrel chested  Heart: regular rate and rhythm  Abdomen: abnormal findings:  diffuse tenderness to palpation mainly in the epigastric regions  Extremities: extremities normal, atraumatic, no cyanosis or edema      Laboratory:  Trended Lab Data:  Recent Labs     01/07/24 0928 01/07/24  1323   WBC 13.53* 18.45*   HGB 10.0* 9.2*   HCT 30.6* 27.7*    261   *  --    K 4.1  --      --    CO2 13*  --    BUN 25*  --    CREATININE 1.2  --    *  --    BILITOT 0.3  --    AST 10  --    ALT 5*  --    ALKPHOS 59  --    CALCIUM 8.6*  --    ALBUMIN 2.3*  --    PROT 4.9*  --    MG 2.2  --          Cardiac:   Recent Labs   Lab 01/07/24 0928   TROPONINI 0.016   BNP 26         Urinalysis: No results found for: "LABURIN", "COLORU", "CLARITYU", "SPECGRAV", "LABSPEC", "NITRITE", "PROTEINUR", "GLUCOSEU", "KETONESU", "UROBILINOGEN", "BILIRUBINUR", "BLOODU"    Microbiology:  Microbiology Results (last 7 days)       Procedure Component Value Units Date/Time    Blood Culture #1 **CANNOT BE ORDERED STAT** [[id number]] Collected: 01/07/24 0936    Order Status: Sent Specimen: Blood from Peripheral, Hand, Left Updated: 01/07/24 0936    Blood Culture #2 **CANNOT BE ORDERED STAT** [[id number]] Collected: 01/07/24 0930    Order Status: Sent Specimen: Blood from Peripheral, Antecubital, Right Updated: 01/07/24 0931            Radiology:  CXR: No acute findings    CT PE: Unremarkable    CT Chest: Ground glass appearance. Emphysematous changes in the bilateral lungs, most notably at the apices. Possible air in the stomach wall consistent with emphysematous gastritis. Notable stranding of the fat along the posterior in the inferior margin of the stomach that extends towards the pancreas.    CT Abdomen: Findings suggestive of a >5 cm lesser curvature gastric ulcer with " erosion into and inflammation of the adjacent pancreatic tail/pancreatitis.  Blood products are suspected within the gastric lumen. Aortic aneurysm dilation with eccentric mural thrombus.    I have personally reviewed the above labs and imaging.    Current Medications:     Infusions:       Scheduled:   albuterol-ipratropium  3 mL Nebulization Q6H WAKE    nicotine  1 patch Transdermal Daily    pantoprazole  40 mg Intravenous BID    pantoprazole  40 mg Intravenous Once    piperacillin-tazobactam (Zosyn) IV (PEDS and ADULTS) (extended infusion is not appropriate)  4.5 g Intravenous Q8H    vancomycin (VANCOCIN) IV (PEDS and ADULTS)  2,250 mg Intravenous Once    [START ON 1/8/2024] vancomycin (VANCOCIN) IV (PEDS and ADULTS)  2,500 mg Intravenous Q24H        PRN:  sodium chloride 0.9%, dextrose 10%, dextrose 10%, glucagon (human recombinant), insulin aspart U-100, Pharmacy to dose Vancomycin consult **AND** vancomycin - pharmacy to dose     Assessment:     Pino Nance is a 73 y.o. male presenting with a case of hypotension and anion gap metabolic acidosis secondary to acute gastric ulcer bleed and possible pancreatitis in the setting of tobacco use disorder and possible gastric or pancreatic malignancy.     Plan:     Neuro:  No acute concerns  - AAOx3, cooperative and calm    Cardiovascular/Hemodynamics:  Hemorrhagic shock   Acute gastric ulcer bleed  Suspected sepsis  - CBC and imaging indicate a bleeding gastric ulcer as the likely source of his blood loss and resulting hypovolemia. Also likely responsible for elevated D-dimer. BP stabilized after fluid administration in the ED. All other vitals stable.  - Last ABG 7.309/31.5/15.8; anion gap = 23 (0900 1/7/24)  - Lactic acid level down trending 9.9 (0930) --> 2.7 (1323)  - WBC count elevated to 18.5 this afternoon.   - Blood cultures collected and started on vancomycin and zosyn.     Plan:  -EGD tomorrow to attempt bleeding control and evaluate the need for  surgical management  -Repeat Hgb. Typed and crossed.   - Goal of transfusion in hemorrhagic shock is reversal of hypoperfusion, not merely Hgb >7.  -Recommend de-escalation of antibiotics as no infectious source for MRSA is suspected, can de-escalate to zosyn alone.     Respiratory:   Probable COPD  Tobacco Use Disorder  - Patient's history of daily brown phlegm production, physical exam, emphysematous changes on CXR, and tobacco history are suggestive of newly diagnosed COPD.  - O2 saturations within normal limits at this time. Breathing at baseline effort.    Plan  - DuoNebs Q6  - PFTs, 6MWT on discharge   -  regarding smoking cessation    GI/FEN:  F: Euvolemic at this time  E: at goal; K>4, Mg>2  N: NPO for EGD. Optimize nutrition as soon as possible post-procedure.    Gastric ulcer bleed  Pancreatitis  Possible GI malignancy  - Gastric ulcer bleed seen on imaging. Meets 2/3 criteria for pancreatitis. Recent weight loss also concerning for possible gastric or pancreatic malignancy    Plan:  - EGD tomorrow  - NPO at this time. Optimize nutrition after EGD.    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 1.2, unknown basline  - Renally dose all medication, avoid nephrotoxic agents  - Strict I/Os, daily weights    Heme/Onc:   Possible gastric vs pancreatic malignancy   Symptomatic Anemia  - WBC 18.45, Hgb 9.2  - Biposy of gastric ulcer will be performed    Endocrine:  Hyperglycemia  - A1c pending  - SSI + Accuchecks  - Goal glucose 140-180 while in ICU    TSH: 2.372    Rheum/MSK:  No acute concerns     Critical Care Daily Checklist:    A: Awake: RASS Goal/Actual Goal: 0  Actual: Dickson Agitation Sedation Scale (RASS): 0   B: Spontaneous Breathing Trial Performed? N/A   C:                        D: Delirium: CAM-ICU Overall CAM-ICU: N/A   E: Early Mobility  Performed? No   F: Feeding Goal: Regular diet  Status: NPO pre-procedure     AS: Analgesia/Sedation None   T: Thromboembolic Prophylaxis None   H: HOB > 300 Yes   U: Stress Ulcer Prophylaxis (if needed) N/A   G: Glucose Control SSI   B: Bowel Function Stool Occurrence: 1   I: Indwelling Catheter (Lines & Malave) Necessity No   D: De-escalation of Antimicrobials/Pharmacotherapies Yes    Plan for the day/ETD NPO overnight, EGD in the AM    Code Status:  Family/Goals of Care: Full code     Roro Goyal MD  [facility] EM/IM  [facility]

## 2024-01-07 NOTE — PROGRESS NOTES
"Pharmacokinetic Initial Assessment: IV Vancomycin    Assessment/Plan:    Initiate intravenous vancomycin with loading dose of 2250 mg (25 mg/kg) once followed by a maintenance dose of vancomycin 2500mg IV every 24 hours  Desired empiric serum trough concentration is 10 to 20 mcg/mL  Draw vancomycin trough level 60 min prior to third dose on 1/9 at approximately 1430  Pharmacy will continue to follow and monitor vancomycin.      Please contact pharmacy at extension [phone] with any questions regarding this assessment.     Thank you for the consult,   Bobby Craft, PharmD, BCCCP       Patient brief summary:  Pino Nance is a 73 y.o. male initiated on antimicrobial therapy with IV Vancomycin for treatment of suspected intra-abdominal infection    Drug Allergies:   Review of patient's allergies indicates:  No Known Allergies    Actual Body Weight:   90 kg    Renal Function:   Estimated Creatinine Clearance: 60.1 mL/min (based on SCr of 1.2 mg/dL).    Dialysis Method (if applicable):  N/A    CBC (last 72 hours):  Recent Labs   Lab Result Units 01/07/24  0928 01/07/24  1323   WBC K/uL 13.53* 18.45*   Hemoglobin g/dL 10.0* 9.2*   Hematocrit % 30.6* 27.7*   Platelets K/uL 343 261   Gran % % 64.9 87.8*   Lymph % % 25.0 6.4*   Mono % % 6.4 4.6   Eosinophil % % 0.7 0.1   Basophil % % 0.3 0.2   Differential Method  Automated Automated       Metabolic Panel (last 72 hours):  Recent Labs   Lab Result Units 01/07/24  0928   Sodium mmol/L 135*   Potassium mmol/L 4.1   Chloride mmol/L 103   CO2 mmol/L 13*   Glucose mg/dL 286*   BUN mg/dL 25*   Creatinine mg/dL 1.2   Albumin g/dL 2.3*   Total Bilirubin mg/dL 0.3   Alkaline Phosphatase U/L 59   AST U/L 10   ALT U/L 5*   Magnesium mg/dL 2.2       Drug levels (last 3 results):  No results for input(s): "VANCOMYCINRA", "VANCORANDOM", "VANCOMYCINPE", "VANCOPEAK", "VANCOMYCINTR", "VANCOTROUGH" in the last 72 hours.    Microbiologic Results:  Microbiology Results (last 7 days)       " Procedure Component Value Units Date/Time    Blood Culture #1 **CANNOT BE ORDERED STAT** [[id number]] Collected: 01/07/24 0936    Order Status: Sent Specimen: Blood from Peripheral, Hand, Left Updated: 01/07/24 0936    Blood Culture #2 **CANNOT BE ORDERED STAT** [[id number]] Collected: 01/07/24 0930    Order Status: Sent Specimen: Blood from Peripheral, Antecubital, Right Updated: 01/07/24 0931

## 2024-01-07 NOTE — ASSESSMENT & PLAN NOTE
- Large mass seen in stomach on CT scan.  Exam benign, no overt perforation or peritonitis  - NPO  - bid PPI  - Plan for EGD in am

## 2024-01-07 NOTE — MEDICAL/APP STUDENT
[facility] Note    Primary Attending Physician: Dr. Hinojosa  ICU Attending: Dr. Vasquez    Reason for Consult:     Hypotension    Subjective:      History of Present Illness:  Pino Nance is a 73 y.o. male with no documented past medical history presents to the ICU with hypotension, generalized weakness, and abdominal pain. The patient presented to the [facility] ED on 1/7/2024 after experiencing profound weakness and had an episode of hematemesis while having a BM. Per EMS, his BP in the field was 74/40 and his O2 saturation was 82% on non-rebreather. He has not seen a physician in the last 40 years.     He began experiencing constant diffuse abdominal pain 3-4 months ago along with a bad taste in his mouth and subsequent loss of appetite. Additionally, he has also experience a 50 lb weight loss over the course of a few months. He denies any prior episodes of hematemesis before today.    The patient has a 116 pack/year (2 pack per day) smoking history. He currently smoked 1 pack per day. He reports chronic SOB and coughs up brown sputum daily. He denies any alcohol, marijuana, or illicit drug use.    Past Medical History:  History reviewed. No pertinent past medical history.    Past Surgical History:  Appendectomy at 9 y/o    Allergies:  Review of patient's allergies indicates:  No Known Allergies    Medications:   In-Hospital Scheduled Medications:   albuterol-ipratropium  3 mL Nebulization Q6H WAKE    nicotine  1 patch Transdermal Daily    pantoprazole  40 mg Intravenous BID    piperacillin-tazobactam (Zosyn) IV (PEDS and ADULTS) (extended infusion is not appropriate)  4.5 g Intravenous Q8H    vancomycin (VANCOCIN) IV (PEDS and ADULTS)  2,250 mg Intravenous Once      In-Hospital PRN Medications:  dextrose 10%, dextrose 10%, glucagon (human recombinant), insulin aspart U-100, Pharmacy to dose Vancomycin consult **AND** vancomycin - pharmacy to dose   In-Hospital IV Infusion  "Medications:     Home Medications:  Prior to Admission medications    Medication Sig Start Date End Date Taking? Authorizing Provider   ibuprofen (ADVIL,MOTRIN) 200 MG tablet Take 200 mg by mouth every 6 (six) hours as needed for Pain.   Yes Provider, Historical       Family History:  Family History   Family history unknown: Yes       Social History:  Social History     Tobacco Use    Smoking status: Every Day     Current packs/day: 1.00     Average packs/day: 1 pack/day for 58.0 years (58.0 ttl pk-yrs)     Types: Cigarettes     Start date:    Substance Use Topics    Alcohol use: Not Currently    Drug use: Never       Review of Systems:  Constitutional: negative  Eyes: negative  Respiratory: positive for SOB  Cardiovascular: negative  Gastrointestinal: positive for abdominal pain, diarrhea, nausea, reflux symptoms, and vomiting  Neurological: negative All other systems are reviewed and are negative.     Objective:   Last 24 Hour Vital Signs:  BP  Min: 74/51  Max: 126/50  Temp  Av °F (36.1 °C)  Min: 96.4 °F (35.8 °C)  Max: 97.6 °F (36.4 °C)  Pulse  Av.3  Min: 78  Max: 102  Resp  Av.1  Min: 16  Max: 36  SpO2  Av.1 %  Min: 90 %  Max: 100 %  Height  Av' 11.5" (181.6 cm)  Min: 5' 11" (180.3 cm)  Max: 6' (182.9 cm)  Weight  Av.6 kg (204 lb 3.3 oz)  Min: 90 kg (198 lb 6.6 oz)  Max: 95.3 kg (210 lb)  No intake/output data recorded.    Physical Examination:  BP (!) 83/62   Pulse 92   Temp 97.6 °F (36.4 °C) (Oral)   Resp 20   Ht 5' 11" (1.803 m)   Wt 90 kg (198 lb 6.6 oz)   SpO2 98%   BMI 27.67 kg/m²   General appearance: alert, appears stated age, and cooperative  Head: Normocephalic, without obvious abnormality, atraumatic  Eyes: conjunctivae/corneas clear. PERRL, EOM's intact  Neck: no JVD and supple, symmetrical, trachea midline  Back: no kyphosis present, no scoliosis present  Lungs:  coarse breath sounds bilaterally  Chest wall: no tenderness, barrel chested  Heart: regular rate " "and rhythm  Abdomen: abnormal findings:  diffuse tenderness to palpation mainly in the epigastric regions  Extremities: extremities normal, atraumatic, no cyanosis or edema      Laboratory:  Trended Lab Data:  Recent Labs     01/07/24 0928 01/07/24  1323   WBC 13.53* 18.45*   HGB 10.0* 9.2*   HCT 30.6* 27.7*    261   *  --    K 4.1  --      --    CO2 13*  --    BUN 25*  --    CREATININE 1.2  --    *  --    BILITOT 0.3  --    AST 10  --    ALT 5*  --    ALKPHOS 59  --    CALCIUM 8.6*  --    ALBUMIN 2.3*  --    PROT 4.9*  --    MG 2.2  --        Cardiac:   Recent Labs   Lab 01/07/24 0928   TROPONINI 0.016   BNP 26       Urinalysis: No results found for: "LABURIN", "COLORU", "CLARITYU", "SPECGRAV", "LABSPEC", "NITRITE", "PROTEINUR", "GLUCOSEU", "KETONESU", "UROBILINOGEN", "BILIRUBINUR", "BLOODU"    Microbiology:  Microbiology Results (last 7 days)       Procedure Component Value Units Date/Time    Blood Culture #1 **CANNOT BE ORDERED STAT** [[id number]] Collected: 01/07/24 0936    Order Status: Sent Specimen: Blood from Peripheral, Hand, Left Updated: 01/07/24 0936    Blood Culture #2 **CANNOT BE ORDERED STAT** [[id number]] Collected: 01/07/24 0930    Order Status: Sent Specimen: Blood from Peripheral, Antecubital, Right Updated: 01/07/24 0931            Radiology:  CXR: No acute findings    CT PE: Unremarkable    CT Chest: Ground glass appearance. Emphysematous changes in the bilateral lungs, most notably at the apices. Possible air in the stomach wall consistent with emphysematous gastritis. Notable stranding of the fat along the posterior in the inferior margin of the stomach that extends towards the pancreas.    CT Abdomen: Findings suggestive of a large lesser curvature gastric ulcer with erosion into and inflammation of the adjacent pancreatic tail/pancreatitis.  Blood products are suspected within the gastric lumen. Aortic aneurysm dilation with eccentric mural thrombus.    I have " personally reviewed the above labs and imaging.    Current Medications:     Infusions:       Scheduled:   albuterol-ipratropium  3 mL Nebulization Q6H WAKE    nicotine  1 patch Transdermal Daily    pantoprazole  40 mg Intravenous BID    piperacillin-tazobactam (Zosyn) IV (PEDS and ADULTS) (extended infusion is not appropriate)  4.5 g Intravenous Q8H    vancomycin (VANCOCIN) IV (PEDS and ADULTS)  2,250 mg Intravenous Once        PRN:  dextrose 10%, dextrose 10%, glucagon (human recombinant), insulin aspart U-100, Pharmacy to dose Vancomycin consult **AND** vancomycin - pharmacy to dose     Assessment:     Pino Nance is a 73 y.o. male presents with a case of hypotension and metabolic acidosis secondary to acute gastric ulcer bleed and subsequent pancreatitis in the setting of tobacco use disorder and possible malignancy complicated by probable COPD.     Plan:     Neuro:  No acute concerns  - AAOx3, cooperative and calm    Cardiovascular/Hemodynamics:  Hypotension  Acute gastric ulcer bleed  Sepsis  - CBC and imaging indicate a bleeding gastric ulcer as the likely source of his blood loss and resulting hypovolemia. Also likely responsible for elevated D-dimer. BP stabilized after fluid administration in the ED. All other vitals stable.  - Last ABG 7.309/31.5/15.8; anion gap = 23 (0900 this AM)  - Lactic acid level down trending 9.9 (0930) --> 2.7 (1323)  - WBC count elevated to 18.5 this afternoon. Blood cultures collected and started on vancomycin and zosyn.     Plan:  -EGD tomorrow to attempt bleeding control and evaluate the need for surgical management  -Repeat Hbg. If <7, consider transfusion. Typed and crossed.  -Consider de-escalation of antibiotics since infectious source is unclear. Trend WBC    Respiratory:   Probable COPD  Tobacco Use Disorder  - Patient's history of daily brown phlegm production, physical exam, emphysematous changes on CXR, and tobacco history are suggestive of newly diagnosed  COPD.  - O2 saturations within normal limits at this time. Breathing at baseline effort.    Plan  - DuoNebs Q6  -  regarding smoking cessation    GI/FEN:  F: Euvolemic at this time  E: at goal; K>4, Mg>2  N: NPO due to pancreatitis and upcoming procedure. Optimize nutrition as soon as possible post-procedure.    Gastric ulcer bleed  Pancreatitis  Possible GI malignancy  - Gastric ulcer bleed seen on imaging. Meets 2/3 criteria for pancreatitis. Recent weight loss also concerning for possible gastric malignancy    Plan:  - EGD tomorrow  - NPO at this time. Optimize nutrition after EGD.    ID:   Emphysematous gastritis  Sepsis  - Currently on vancomycin and zosyn  - Continue to follow blood cultures    Renal:   No acute concerns  - Cr. 1.2  - Renally dose all medication, avoid nephrotoxic agents  - Strict I/Os, daily weights    Heme/Onc:   Possible gastric malignancy  Symptomatic Anemia  - WBC 18.45, Hgb 9.2  - Biposy of gastric ulcer will be performed    Endocrine:  Hyperglycemia  - A1c pending  - SSI + Accuchecks  - Goal glucose 140-180 while in ICU    TSH: 2.372    Rheum/MSK:  No acute concerns     Critical Care Daily Checklist:    A: Awake: RASS Goal/Actual Goal: 0  Actual: Dickson Agitation Sedation Scale (RASS): 0   B: Spontaneous Breathing Trial Performed? N/A   C:                        D: Delirium: CAM-ICU Overall CAM-ICU: N/A   E: Early Mobility Performed? No   F: Feeding Goal: Regular diet  Status: NPO pre-procedure     AS: Analgesia/Sedation None   T: Thromboembolic Prophylaxis None   H: HOB > 300 Yes   U: Stress Ulcer Prophylaxis (if needed) N/A   G: Glucose Control SSI   B: Bowel Function Stool Occurrence: 1   I: Indwelling Catheter (Lines & Malave) Necessity No   D: De-escalation of Antimicrobials/Pharmacotherapies Yes    Plan for the day/ETD NPO overnight, EGD in the AM    Code Status:  Family/Goals of Care: Full code     Irma Masno  [facility] EM/IM  [facility]

## 2024-01-07 NOTE — ASSESSMENT & PLAN NOTE
- presents with 1 episode mild hematemesis; given findings on CT, concern for emphysematous gastritis versus malignancy  - Pathway initiated  - 2 LBIV  - initiated on protonix 40mg IV bid  - hold anticoagulation  - trend CBC tid for now; transfuse Hb <7  - NPO  - GI consulted, suspect will need EGD tomorrow  - dedicated CT scan of abdomen ordered

## 2024-01-07 NOTE — SUBJECTIVE & OBJECTIVE
History reviewed. No pertinent past medical history.    History reviewed. No pertinent surgical history.    Review of patient's allergies indicates:  No Known Allergies  Family History    Family history is unknown by patient.       Tobacco Use    Smoking status: Every Day     Current packs/day: 1.00     Average packs/day: 1 pack/day for 58.0 years (58.0 ttl pk-yrs)     Types: Cigarettes     Start date: 1966    Smokeless tobacco: Not on file   Substance and Sexual Activity    Alcohol use: Not Currently    Drug use: Never    Sexual activity: Not on file     Review of Systems   Constitutional:  Positive for appetite change and unexpected weight change.   Eyes:  Negative for photophobia and visual disturbance.   Respiratory:  Negative for chest tightness, shortness of breath and wheezing.    Cardiovascular:  Negative for chest pain, palpitations and leg swelling.   Gastrointestinal:  Positive for abdominal pain and vomiting.   Genitourinary:  Negative for dysuria, flank pain and hematuria.   Musculoskeletal:  Negative for joint swelling and myalgias.   Skin:  Negative for color change and rash.   Neurological:  Negative for dizziness and speech difficulty.   Psychiatric/Behavioral:  Negative for confusion and hallucinations.      Objective:     Vital Signs (Most Recent):  Temp: 97.6 °F (36.4 °C) (01/07/24 1405)  Pulse: 82 (01/07/24 1415)  Resp: (!) 26 (01/07/24 1415)  BP: (!) 93/55 (01/07/24 1415)  SpO2: 99 % (01/07/24 1415) Vital Signs (24h Range):  Temp:  [96.4 °F (35.8 °C)-97.6 °F (36.4 °C)] 97.6 °F (36.4 °C)  Pulse:  [] 82  Resp:  [17-36] 26  SpO2:  [90 %-100 %] 99 %  BP: ()/(40-69) 93/55     Weight: 90 kg (198 lb 6.6 oz) (01/07/24 1405)  Body mass index is 27.67 kg/m².      Intake/Output Summary (Last 24 hours) at 1/7/2024 5933  Last data filed at 1/7/2024 1224  Gross per 24 hour   Intake 1101.38 ml   Output --   Net 1101.38 ml       Lines/Drains/Airways       Peripheral Intravenous Line  Duration        "           Peripheral IV - Single Lumen 01/07/24 0945 20 G Anterior;Left Antecubital <1 day         Peripheral IV - Single Lumen 01/07/24 0949 20 G Right Antecubital <1 day         Peripheral IV - Single Lumen 01/07/24 0950 20 G Anterior;Right Hand <1 day                     Physical Exam  Constitutional:       Appearance: Normal appearance. He is well-developed. He is ill-appearing.   HENT:      Head: Normocephalic and atraumatic.      Mouth/Throat:      Comments: Very poor dentition  Eyes:      Extraocular Movements: Extraocular movements intact.      Pupils: Pupils are equal, round, and reactive to light.   Pulmonary:      Effort: Pulmonary effort is normal. No respiratory distress.   Abdominal:      General: There is no distension.      Palpations: Abdomen is soft.      Tenderness: There is no abdominal tenderness. There is no guarding or rebound.   Musculoskeletal:         General: No deformity. Normal range of motion.      Cervical back: Normal range of motion and neck supple.   Skin:     General: Skin is warm and dry.   Neurological:      General: No focal deficit present.      Mental Status: He is alert and oriented to person, place, and time.   Psychiatric:         Mood and Affect: Mood normal.         Behavior: Behavior normal.          Significant Labs:  Blood Culture: No results for input(s): "LABBLOO" in the last 48 hours.  CBC:   Recent Labs   Lab 01/07/24 0928 01/07/24  1323   WBC 13.53* 18.45*   HGB 10.0* 9.2*   HCT 30.6* 27.7*    261     BMP:   Recent Labs   Lab 01/07/24 0928   *   *   K 4.1      CO2 13*   BUN 25*   CREATININE 1.2   CALCIUM 8.6*   MG 2.2     CMP:   Recent Labs   Lab 01/07/24  0928   *   CALCIUM 8.6*   ALBUMIN 2.3*   PROT 4.9*   *   K 4.1   CO2 13*      BUN 25*   CREATININE 1.2   ALKPHOS 59   ALT 5*   AST 10   BILITOT 0.3     Coagulation: No results for input(s): "PT", "INR", "APTT" in the last 48 hours.    Significant Imaging:  Imaging " results within the past 24 hours have been reviewed. Large mass in the proximal stomach.

## 2024-01-07 NOTE — HPI
73-year-old man with tobacco use disorder who presents with episode of hematemesis and lightheadedness.  He states that he has not seen a doctor in almost 40 years and is on no home medications including NSAIDs (chart review shows colonoscopy for rectal bleeding with Dr. Harper 2012, as well as numerous visits at [facility] over the past 10 years for orthopedic issues) .  He states that over the past several months he has been having stomach pain associated with decreased appetite.  Also reports the food does not taste as good as normal and that he has had a bad taste in his mouth that stays there over this time.  States that he used to be a size 52 waist but that now his pants are no longer fitting.  He does not weigh himself so unsure how much weight he has lost.

## 2024-01-07 NOTE — ASSESSMENT & PLAN NOTE
-presents with hypotension likely due to hemorrhage versus sepsis  -fluid resuscitated in the ED with borderline response  -low threshold to initiate on pressors  -follow-up on repeat lactic acid

## 2024-01-07 NOTE — SUBJECTIVE & OBJECTIVE
History reviewed. No pertinent past medical history.    History reviewed. No pertinent surgical history.    Review of patient's allergies indicates:  No Known Allergies    No current facility-administered medications on file prior to encounter.     No current outpatient medications on file prior to encounter.     Family History    Family history is unknown by patient.       Tobacco Use    Smoking status: Every Day     Current packs/day: 1.00     Average packs/day: 1 pack/day for 58.0 years (58.0 ttl pk-yrs)     Types: Cigarettes     Start date: 1966    Smokeless tobacco: Not on file   Substance and Sexual Activity    Alcohol use: Not Currently    Drug use: Never    Sexual activity: Not on file     Review of Systems   Constitutional:  Positive for appetite change, chills, fever and unexpected weight change. Negative for diaphoresis.   HENT:  Negative for congestion and sore throat.    Eyes:  Negative for discharge and visual disturbance.   Respiratory:  Negative for cough and shortness of breath.    Cardiovascular:  Negative for chest pain and leg swelling.   Gastrointestinal:  Positive for abdominal pain, blood in stool, nausea and vomiting.   Genitourinary:  Negative for dysuria and flank pain.   Musculoskeletal:  Negative for arthralgias and joint swelling.   Skin:  Negative for rash and wound.   Allergic/Immunologic: Negative for immunocompromised state.   Neurological:  Negative for light-headedness and headaches.   Psychiatric/Behavioral:  Negative for agitation and confusion.      Objective:     Vital Signs (Most Recent):  Temp: 96.4 °F (35.8 °C) (01/07/24 0947)  Pulse: 94 (01/07/24 1112)  Resp: (!) 25 (01/07/24 1112)  BP: 110/64 (01/07/24 1112)  SpO2: 100 % (01/07/24 1112) Vital Signs (24h Range):  Temp:  [96.4 °F (35.8 °C)] 96.4 °F (35.8 °C)  Pulse:  [] 94  Resp:  [17-36] 25  SpO2:  [90 %-100 %] 100 %  BP: ()/(40-69) 110/64     Weight: 95.3 kg (210 lb)  Body mass index is 28.48 kg/m².      Physical Exam  Vitals reviewed.   Constitutional:       General: He is not in acute distress.     Appearance: He is well-developed. He is not diaphoretic.   HENT:      Head: Normocephalic and atraumatic.      Nose: Nose normal.      Mouth/Throat:      Comments: Poor dentition  Eyes:      General: No scleral icterus.     Pupils: Pupils are equal, round, and reactive to light.   Neck:      Vascular: No JVD.      Trachea: No tracheal deviation.   Cardiovascular:      Rate and Rhythm: Normal rate and regular rhythm.      Heart sounds: Normal heart sounds. No murmur heard.     No friction rub. No gallop.   Pulmonary:      Effort: Pulmonary effort is normal. No respiratory distress.      Breath sounds: Normal breath sounds.      Comments: Tachypnea  Abdominal:      General: There is no distension.      Palpations: Abdomen is soft. There is no mass.      Tenderness: There is abdominal tenderness (Epigastric).      Hernia: No hernia is present.   Musculoskeletal:         General: No deformity.      Cervical back: Normal range of motion.   Skin:     General: Skin is warm and dry.      Findings: No rash.   Neurological:      Mental Status: He is alert and oriented to person, place, and time.   Psychiatric:         Behavior: Behavior normal.              CRANIAL NERVES     CN III, IV, VI   Pupils are equal, round, and reactive to light.       Significant Labs: All pertinent labs within the past 24 hours have been reviewed.    Significant Imaging: I have reviewed all pertinent imaging results/findings within the past 24 hours.

## 2024-01-07 NOTE — ASSESSMENT & PLAN NOTE
This patient does have evidence of infective focus  My overall impression is septic shock due to lactate > 4.  Source: Abdominal  Antibiotics given-   Antibiotics (72h ago, onward)      Start     Stop Route Frequency Ordered    01/07/24 1900  piperacillin-tazobactam (ZOSYN) 4.5 g in dextrose 5 % in water (D5W) 100 mL IVPB (MB+)         -- IV Every 8 hours (non-standard times) 01/07/24 1205          Latest lactate reviewed-  Recent Labs   Lab 01/07/24  0937   LACTATE 9.9*     Organ dysfunction indicated by Acute respiratory failure    Fluid challenge Actual Body weight- Patient will receive 30ml/kg actual body weight to calculate fluid bolus for treatment of septic shock.     Post- resuscitation assessment Yes Perfusion exam was performed within 6 hours of septic shock presentation after bolus shows Adequate tissue perfusion assessed by non-invasive monitoring       Will Not start Pressors- Levophed for MAP of 65  Source control achieved by:  IV antibiotics  -follow-up on blood cultures obtained in the ED

## 2024-01-07 NOTE — ASSESSMENT & PLAN NOTE
-uncharacterized by patient but has been ongoing over several months   -presentation very concerning for underlying GI malignancy; will require additional workup

## 2024-01-07 NOTE — ASSESSMENT & PLAN NOTE
-noted incidentally on chest CTA  -will require further evaluation with CT and likely EGD  -initiated on broad-spectrum IV antibiotics for now

## 2024-01-07 NOTE — PHARMACY MED REC
"Admission Medication History     The home medication history was taken by Emily Elizabeth CPhT.    Medication history obtained from, Patient's Daughter Verified    You may go to "Admission" then "Reconcile Home Medications" tabs to review and/or act upon these items.     The home medication list has been updated by the Pharmacy department.   Please read ALL comments highlighted in yellow.   Please address this information as you see fit.    Feel free to contact us if you have any questions or require assistance.            Emily Elizabeth CPhT.  Ext [phone]               .          "

## 2024-01-07 NOTE — ASSESSMENT & PLAN NOTE
Patient's anemia is currently uncontrolled. Has not received any PRBCs to date. Etiology likely d/t acute blood loss which was from hematemesis and possible superimposed upon chronic loss from suspected gastrointestinal malignancy  Current CBC reviewed-   Lab Results   Component Value Date    HGB 10.0 (L) 01/07/2024    HCT 30.6 (L) 01/07/2024     Monitor serial CBC and transfuse if patient becomes hemodynamically unstable, symptomatic or H/H drops below 7/21.  -check iron studies   -GI consulted for EGD

## 2024-01-07 NOTE — ED PROVIDER NOTES
Encounter Date: 1/7/2024       History     Chief Complaint   Patient presents with    Hypotension     Pt arrived via EMS from home, with complains of generalized weakness/ and x 1 emesis, EMS reports pt has not seen a MD in 40 years, and has no medical hx, sats via EMS= 82% on non re breather, with b/p - 74/40 in the field      Patient is a 73-year-old male brought in by EMS from home where he became short of breath this morning.  He has also had a dry cough as well as chest discomfort.  He denies fever.  EMS reports the patient's blood pressure was 74/40 on their arrival with low oxygen saturations.  Patient denies any lung problems or any other medical issues.  He says he has not seen a doctor in over 40 years and takes no medications daily.  He had 1 episode of diarrhea this morning.  No nausea or vomiting.      Review of patient's allergies indicates:  No Known Allergies  No past medical history on file.  No past surgical history on file.  No family history on file.     Review of Systems   Constitutional:  Negative for fever.   Respiratory:  Positive for cough and shortness of breath.    Cardiovascular:  Positive for chest pain.       Physical Exam     Initial Vitals   BP Pulse Resp Temp SpO2   01/07/24 0915 01/07/24 0915 01/07/24 0915 01/07/24 0947 01/07/24 0915   (!) 109/40 102 (!) 36 96.4 °F (35.8 °C) (!) 90 %      MAP       --                Physical Exam    Nursing note and vitals reviewed.  Constitutional: He is not diaphoretic. He appears distressed.   HENT:   Head: Atraumatic.   Eyes: Conjunctivae are normal. No scleral icterus.   Neck: Neck supple.   Cardiovascular:  Normal rate and regular rhythm.           Pulmonary/Chest: Breath sounds normal. He is in respiratory distress.   Abdominal: Abdomen is soft.   There is mild diffuse tenderness without guarding or rebound.   Musculoskeletal:         General: No edema. Normal range of motion.      Cervical back: Neck supple.     Neurological: He is alert and  oriented to person, place, and time.   Skin: Skin is warm and dry.         ED Course   Critical Care    Date/Time: 1/7/2024 12:16 PM    Performed by: Hiren Dominguez MD  Authorized by: Hiren Dominguez MD  Direct patient critical care time: 90 minutes  Additional history critical care time: 5 minutes  Ordering / reviewing critical care time: 15 minutes  Documentation critical care time: 15 minutes  Consulting other physicians critical care time: 5 minutes  Total critical care time (exclusive of procedural time) : 130 minutes  Critical care was time spent personally by me on the following activities: discussions with primary provider, examination of patient, obtaining history from patient or surrogate, ordering and performing treatments and interventions, ordering and review of laboratory studies, ordering and review of radiographic studies, pulse oximetry and re-evaluation of patient's condition.        Labs Reviewed   CBC W/ AUTO DIFFERENTIAL - Abnormal; Notable for the following components:       Result Value    WBC 13.53 (*)     RBC 3.29 (*)     Hemoglobin 10.0 (*)     Hematocrit 30.6 (*)     RDW 15.9 (*)     Immature Granulocytes 2.7 (*)     Gran # (ANC) 8.8 (*)     Immature Grans (Abs) 0.36 (*)     All other components within normal limits   COMPREHENSIVE METABOLIC PANEL - Abnormal; Notable for the following components:    Sodium 135 (*)     CO2 13 (*)     Glucose 286 (*)     BUN 25 (*)     Calcium 8.6 (*)     Total Protein 4.9 (*)     Albumin 2.3 (*)     ALT 5 (*)     Anion Gap 19 (*)     All other components within normal limits   D DIMER, QUANTITATIVE - Abnormal; Notable for the following components:    D-Dimer 3.51 (*)     All other components within normal limits   LACTIC ACID, PLASMA - Abnormal; Notable for the following components:    Lactate (Lactic Acid) 9.9 (*)     All other components within normal limits    Narrative:      Lactic acid critical result(s) called and verbal readback  obtained   from Gabi Alfaro RN.  by KM12 01/07/2024 10:12   CULTURE, BLOOD   CULTURE, BLOOD   CK   TROPONIN I   B-TYPE NATRIURETIC PEPTIDE   TSH   MAGNESIUM   ALCOHOL,MEDICAL (ETHANOL)   LIPASE   PROCALCITONIN   DRUG SCREEN PANEL, URINE EMERGENCY   URINALYSIS, REFLEX TO URINE CULTURE   LACTIC ACID, PLASMA   CBC W/ AUTO DIFFERENTIAL   SARS-COV-2 RDRP GENE   POCT INFLUENZA A/B MOLECULAR   TYPE & SCREEN     EKG Readings: (Independently Interpreted)   Initial Reading: No STEMI. Rhythm: Normal Sinus Rhythm. Heart Rate: 97. Ectopy: No Ectopy. ST Segments: Normal ST Segments.       Imaging Results              CTA Chest Non-Coronary (PE Studies) (Final result)  Result time 01/07/24 11:32:57      Final result by Ami Bang MD (01/07/24 11:32:57)                   Impression:      No evidence for aortic dissection or pulmonary embolus.    Abnormal appearance of the stomach with abnormal hyperdensity noted and questionable air within the stomach walls.  Component of emphysematous gastritis not excluded.  Additionally, there is stranding of the fat along the posterior in the inferior margin of the stomach that extends towards the pancreas for which pancreatitis cannot be excluded.  Further evaluation with a dedicated CT scan of the abdomen and pelvis with IV and oral contrast material is recommended.    At least 2 indeterminate hypodensities are seen in the left hepatic lobe.  This can be better evaluated with CT scan of the abdomen.      Electronically signed by: Ami Bang MD  Date:    01/07/2024  Time:    11:32               Narrative:    EXAMINATION:  CTA CHEST NON CORONARY (PE STUDIES)    CLINICAL HISTORY:  Pulmonary embolism (PE) suspected, positive D-dimer;    TECHNIQUE:  Low dose axial images, sagittal and coronal reformations were obtained from the thoracic inlet to the lung bases following the IV administration of 100 mL of Omnipaque 350.  Contrast timing was optimized to evaluate the pulmonary  arteries.    Technical quality of the exam: Satisfactory.    COMPARISON:  01/07/2024    FINDINGS:  Calcified atheromatous disease affects the aorta and its branch vessels.  There is no evidence for aortic aneurysm or dissection.  No pulmonary embolus is seen.    The thyroid appears normal.  The main central airways are patent.  The esophagus appears normal.  The heart is normal in size.  Calcified coronary artery disease.  No pericardial effusion.  AP window lymph node measures 1.3 cm.  Right hilar lymph node measures 1.3 cm.  Left hilar lymph node measures 1.1 cm.  No axillary adenopathy.    Centrilobular emphysematous disease.  No consolidation or pleural effusions.  No pulmonary nodules.    Limited evaluation of the upper abdominal structures show an abnormal appearance of the stomach which is difficult to fully evaluate on this examination.  There is also stranding of the fat around the posterior margin of the stomach that extends towards the pancreas, incompletely evaluated.  Indeterminate hypodensity along the anterior margin of the liver in the left hepatic lobe measuring 2.9 cm.  Second indeterminate hypodensity slightly more inferiorly in the left hepatic lobe measuring 1.6 cm.    Age-appropriate degenerative changes affect the skeleton.                                       X-Ray Chest 1 View (Final result)  Result time 01/07/24 10:18:36      Final result by Ami Bang MD (01/07/24 10:18:36)                   Impression:      No acute abnormality.      Electronically signed by: Ami Bang MD  Date:    01/07/2024  Time:    10:18               Narrative:    EXAMINATION:  XR CHEST 1 VIEW    CLINICAL HISTORY:  Shortness of breath;    TECHNIQUE:  Single frontal view of the chest was performed.    COMPARISON:  08/14/2012    FINDINGS:  The lungs are clear with normal appearance of pulmonary vasculature. No pleural effusion. No evident pneumothorax.    The cardiac silhouette is normal in size. The  hilar and mediastinal contours are unremarkable.Calcified atheromatous disease of the aortic arch.    Bones are intact.                                       Medications   iohexoL (OMNIPAQUE 350) injection 75 mL (has no administration in time range)   iohexoL (OMNIPAQUE 12) oral solution 1,000 mL (has no administration in time range)   pantoprazole injection 40 mg (has no administration in time range)   piperacillin-tazobactam (ZOSYN) 4.5 g in dextrose 5 % in water (D5W) 100 mL IVPB (MB+) (has no administration in time range)   nicotine 21 mg/24 hr 1 patch (has no administration in time range)   0.9%  NaCl infusion (0 mLs Intravenous Stopped 1/7/24 1059)   lactated ringers bolus 2,859 mL (2,859 mLs Intravenous New Bag 1/7/24 0951)   iohexoL (OMNIPAQUE 350) injection 100 mL (100 mLs Intravenous Given 1/7/24 1042)   piperacillin-tazobactam (ZOSYN) 4.5 g in dextrose 5 % in water (D5W) 100 mL IVPB (MB+) (4.5 g Intravenous New Bag 1/7/24 1131)     Medical Decision Making  DDx :  Including but not limited to:   COPD, bronchitis, pneumonia, pleural effusion, pulmonary embolism, aortic dissection, sepsis.    Emergent evaluation of a 73-year-old male presenting with shortness of breath, chest pain or abdominal pain.  Sepsis protocol was initiated upon patient arrival.  CT shows evidence of emphysematous gastritis.  Patient has received IV fluids and antibiotics here in the ED. I have discussed these findings with the [facility] hospitalist, Dr. Hinojosa, who will admit.    Amount and/or Complexity of Data Reviewed  Labs: ordered.     Details: CBC with a white blood cell count of 13.53, hemoglobin of 10 and hematocrit of 30.6.  CMP shows a glucose of 286.  D-dimer is 3.5.  Troponin normal.  Radiology: ordered.     Details: Chest x-ray without acute cardiopulmonary abnormality.    CT without evidence of pulmonary embolism or aortic dissection.  There are signs suggesting emphysematous gastritis.  Discussion of management or test  interpretation with external provider(s): I have discussed the patient's presentation, vital signs and all pertinent labs and imaging with Dr. Hinojosa.    Risk  Prescription drug management.  Decision regarding hospitalization.                                      Clinical Impression:  Final diagnoses:  [R06.02] Shortness of breath  [K29.60] Emphysematous gastritis (Primary)          ED Disposition Condition    Admit Stable                Hiren Dominguez MD  01/07/24 4302

## 2024-01-07 NOTE — ASSESSMENT & PLAN NOTE
-reports at least 58 pack-year history  -nicotine patch  -can consider counseling cessation once more stable

## 2024-01-07 NOTE — H&P
[facility]  History & Physical    Patient Name: Pino Nance  MRN: [id number]  Patient Class: IP- Inpatient  Admission Date: 1/7/2024  Attending Physician: Davin Hinojosa MD  Primary Care Provider: Karla Luevano MD         Patient information was obtained from patient, relative(s), and ER records.     Subjective:     Principal Problem:Hypotension    Chief Complaint:   Chief Complaint   Patient presents with    Hypotension     Pt arrived via EMS from home, with complains of generalized weakness/ and x 1 emesis, EMS reports pt has not seen a MD in 40 years, and has no medical hx, sats via EMS= 82% on non re breather, with b/p - 74/40 in the field         HPI: Mr. Nance is a 73-year-old man with tobacco use disorder who presents with episode of hematemesis and lightheadedness.  He states that he has not seen a doctor in almost 40 years and is on no home medications including NSAIDs.  States that over the past several months he has been having stomach pain associated with decreased appetite.  Also reports the food does not taste as good as normal and that he has had a bad taste in his mouth that stays there over this time.  States that he used to be a size 52 waist but that now his pants are no longer fitting.  He does not weigh himself so unsure how much weight he has lost.    History reviewed. No pertinent past medical history.    History reviewed. No pertinent surgical history.    Review of patient's allergies indicates:  No Known Allergies    No current facility-administered medications on file prior to encounter.     No current outpatient medications on file prior to encounter.     Family History    Family history is unknown by patient.       Tobacco Use    Smoking status: Every Day     Current packs/day: 1.00     Average packs/day: 1 pack/day for 58.0 years (58.0 ttl pk-yrs)     Types: Cigarettes     Start date: 1966    Smokeless tobacco: Not on file    Substance and Sexual Activity    Alcohol use: Not Currently    Drug use: Never    Sexual activity: Not on file     Review of Systems   Constitutional:  Positive for appetite change, chills, fever and unexpected weight change. Negative for diaphoresis.   HENT:  Negative for congestion and sore throat.    Eyes:  Negative for discharge and visual disturbance.   Respiratory:  Negative for cough and shortness of breath.    Cardiovascular:  Negative for chest pain and leg swelling.   Gastrointestinal:  Positive for abdominal pain, blood in stool, nausea and vomiting.   Genitourinary:  Negative for dysuria and flank pain.   Musculoskeletal:  Negative for arthralgias and joint swelling.   Skin:  Negative for rash and wound.   Allergic/Immunologic: Negative for immunocompromised state.   Neurological:  Negative for light-headedness and headaches.   Psychiatric/Behavioral:  Negative for agitation and confusion.      Objective:     Vital Signs (Most Recent):  Temp: 96.4 °F (35.8 °C) (01/07/24 0947)  Pulse: 94 (01/07/24 1112)  Resp: (!) 25 (01/07/24 1112)  BP: 110/64 (01/07/24 1112)  SpO2: 100 % (01/07/24 1112) Vital Signs (24h Range):  Temp:  [96.4 °F (35.8 °C)] 96.4 °F (35.8 °C)  Pulse:  [] 94  Resp:  [17-36] 25  SpO2:  [90 %-100 %] 100 %  BP: ()/(40-69) 110/64     Weight: 95.3 kg (210 lb)  Body mass index is 28.48 kg/m².     Physical Exam  Vitals reviewed.   Constitutional:       General: He is not in acute distress.     Appearance: He is well-developed. He is not diaphoretic.   HENT:      Head: Normocephalic and atraumatic.      Nose: Nose normal.      Mouth/Throat:      Comments: Poor dentition  Eyes:      General: No scleral icterus.     Pupils: Pupils are equal, round, and reactive to light.   Neck:      Vascular: No JVD.      Trachea: No tracheal deviation.   Cardiovascular:      Rate and Rhythm: Normal rate and regular rhythm.      Heart sounds: Normal heart sounds. No murmur heard.     No friction rub.  No gallop.   Pulmonary:      Effort: Pulmonary effort is normal. No respiratory distress.      Breath sounds: Normal breath sounds.      Comments: Tachypnea  Abdominal:      General: There is no distension.      Palpations: Abdomen is soft. There is no mass.      Tenderness: There is abdominal tenderness (Epigastric).      Hernia: No hernia is present.   Musculoskeletal:         General: No deformity.      Cervical back: Normal range of motion.   Skin:     General: Skin is warm and dry.      Findings: No rash.   Neurological:      Mental Status: He is alert and oriented to person, place, and time.   Psychiatric:         Behavior: Behavior normal.              CRANIAL NERVES     CN III, IV, VI   Pupils are equal, round, and reactive to light.       Significant Labs: All pertinent labs within the past 24 hours have been reviewed.    Significant Imaging: I have reviewed all pertinent imaging results/findings within the past 24 hours.  Assessment/Plan:     * Hypotension  -presents with hypotension likely due to hemorrhage versus sepsis  -fluid resuscitated in the ED with borderline response  -low threshold to initiate on pressors  -follow-up on repeat lactic acid      Weight loss  -uncharacterized by patient but has been ongoing over several months   -presentation very concerning for underlying GI malignancy; will require additional workup    Emphysema lung  Patient's COPD is controlled currently.  Patient is currently off COPD Pathway.  Suspect that tachypnea time of admission was secondary to lactic acidosis  -may benefit from outpatient PFTs and smoking cessation    Sepsis  This patient does have evidence of infective focus  My overall impression is septic shock due to lactate > 4.  Source: Abdominal  Antibiotics given-   Antibiotics (72h ago, onward)      Start     Stop Route Frequency Ordered    01/07/24 1900  piperacillin-tazobactam (ZOSYN) 4.5 g in dextrose 5 % in water (D5W) 100 mL IVPB (MB+)         -- IV Every  8 hours (non-standard times) 01/07/24 1205          Latest lactate reviewed-  Recent Labs   Lab 01/07/24  0937   LACTATE 9.9*     Organ dysfunction indicated by Acute respiratory failure    Fluid challenge Actual Body weight- Patient will receive 30ml/kg actual body weight to calculate fluid bolus for treatment of septic shock.     Post- resuscitation assessment Yes Perfusion exam was performed within 6 hours of septic shock presentation after bolus shows Adequate tissue perfusion assessed by non-invasive monitoring       Will Not start Pressors- Levophed for MAP of 65  Source control achieved by:  IV antibiotics  -follow-up on blood cultures obtained in the ED    Lactic acidosis  -in the setting possible sepsis      Hyperglycemia  -noted on admission   -check A1c  -sliding scale insulin      Lung nodules  -noted on admission CTA; can follow-up as outpatient      Tobacco abuse  -reports at least 58 pack-year history  -nicotine patch  -can consider counseling cessation once more stable      Anemia  Patient's anemia is currently uncontrolled. Has not received any PRBCs to date. Etiology likely d/t acute blood loss which was from hematemesis and possible superimposed upon chronic loss from suspected gastrointestinal malignancy  Current CBC reviewed-   Lab Results   Component Value Date    HGB 10.0 (L) 01/07/2024    HCT 30.6 (L) 01/07/2024     Monitor serial CBC and transfuse if patient becomes hemodynamically unstable, symptomatic or H/H drops below 7/21.  -check iron studies   -GI consulted for EGD    Emphysematous gastritis  -noted incidentally on chest CTA  -will require further evaluation with CT and likely EGD  -initiated on broad-spectrum IV antibiotics for now      Hematemesis with nausea  - presents with 1 episode mild hematemesis; given findings on CT, concern for emphysematous gastritis versus malignancy  - Pathway initiated  - 2 LBIV  - initiated on protonix 40mg IV bid  - hold anticoagulation  - trend CBC  tid for now; transfuse Hb <7  - NPO  - GI consulted, suspect will need EGD tomorrow  - dedicated CT scan of abdomen ordered        VTE Risk Mitigation (From admission, onward)           Ordered     IP VTE HIGH RISK PATIENT  Once         01/07/24 1204     Place sequential compression device  Until discontinued         01/07/24 1204                     35 minutes of critical care time was spent personally by me on the following activities: development of treatment plan with patient or surrogate and bedside caregivers, discussions with consultants, evaluation of patient's response to treatment, examination of patient, ordering and performing treatments and interventions, ordering and review of laboratory studies, ordering and review of radiographic studies, pulse oximetry, re-evaluation of patient's condition. This critical care time did not overlap with that of any other provider or involve time for any procedures.                 Davin Hinojosa MD  Department of Hospital Medicine  [facility] - Emergency Dept

## 2024-01-07 NOTE — ED TRIAGE NOTES
"73 y.o male presents to the ED with CC of hypotension. Presents via EMS, report he was SOB this morning with a dry cough and chest discomfort. EMS reports his O2 sat was 77% RA and placed on 15 L NRB and the patients BP was 74/40 and NS was started. Pt denies hx of lung problems or medical hx. Reports he has not seen a doctor in over 40 years and does not take daily meds. Reports 1 episode of diarrhea this morning. Reports he was vomiting blood earlier this week. States he has had upper abdominal pain for a "few weeks". Denies any other complaints. AAOx4.  "

## 2024-01-07 NOTE — HPI
Mr. Nance is a 73-year-old man with tobacco use disorder who presents with episode of hematemesis and lightheadedness.  He states that he has not seen a doctor in almost 40 years and is on no home medications including NSAIDs.  States that over the past several months he has been having stomach pain associated with decreased appetite.  Also reports the food does not taste as good as normal and that he has had a bad taste in his mouth that stays there over this time.  States that he used to be a size 52 waist but that now his pants are no longer fitting.  He does not weigh himself so unsure how much weight he has lost.

## 2024-01-07 NOTE — ASSESSMENT & PLAN NOTE
Patient's COPD is controlled currently.  Patient is currently off COPD Pathway.  Suspect that tachypnea time of admission was secondary to lactic acidosis  -may benefit from outpatient PFTs and smoking cessation

## 2024-01-08 ENCOUNTER — ANESTHESIA (OUTPATIENT)
Dept: ENDOSCOPY | Facility: HOSPITAL | Age: 74
DRG: 871 | End: 2024-01-08
Payer: MEDICARE

## 2024-01-08 ENCOUNTER — ANESTHESIA EVENT (OUTPATIENT)
Dept: ENDOSCOPY | Facility: HOSPITAL | Age: 74
DRG: 871 | End: 2024-01-08
Payer: MEDICARE

## 2024-01-08 ENCOUNTER — CLINICAL SUPPORT (OUTPATIENT)
Dept: SMOKING CESSATION | Facility: CLINIC | Age: 74
End: 2024-01-08

## 2024-01-08 DIAGNOSIS — F17.210 CIGARETTE SMOKER: Primary | ICD-10-CM

## 2024-01-08 PROBLEM — C16.0 MALIGNANT NEOPLASM OF CARDIA OF STOMACH: Status: ACTIVE | Noted: 2024-01-08

## 2024-01-08 PROBLEM — K25.0 ACUTE GASTRIC ULCER WITH HEMORRHAGE: Status: ACTIVE | Noted: 2024-01-08

## 2024-01-08 LAB
ANION GAP SERPL CALC-SCNC: 6 MMOL/L (ref 8–16)
ASCENDING AORTA: 3.68 CM
AV INDEX (PROSTH): 0.66
AV MEAN GRADIENT: 7 MMHG
AV PEAK GRADIENT: 13 MMHG
AV REGURGITATION PRESSURE HALF TIME: 740.93 MS
AV VALVE AREA BY VELOCITY RATIO: 3.68 CM²
AV VALVE AREA: 3.81 CM²
AV VELOCITY RATIO: 0.63
BASOPHILS # BLD AUTO: 0.05 K/UL (ref 0–0.2)
BASOPHILS # BLD AUTO: 0.05 K/UL (ref 0–0.2)
BASOPHILS # BLD AUTO: 0.06 K/UL (ref 0–0.2)
BASOPHILS NFR BLD: 0.3 % (ref 0–1.9)
BASOPHILS NFR BLD: 0.4 % (ref 0–1.9)
BASOPHILS NFR BLD: 0.4 % (ref 0–1.9)
BSA FOR ECHO PROCEDURE: 2.11 M2
BUN SERPL-MCNC: 38 MG/DL (ref 8–23)
CALCIUM SERPL-MCNC: 8.4 MG/DL (ref 8.7–10.5)
CHLORIDE SERPL-SCNC: 107 MMOL/L (ref 95–110)
CO2 SERPL-SCNC: 21 MMOL/L (ref 23–29)
CREAT SERPL-MCNC: 0.8 MG/DL (ref 0.5–1.4)
CV ECHO LV RWT: 0.48 CM
DIFFERENTIAL METHOD BLD: ABNORMAL
DOP CALC AO PEAK VEL: 1.8 M/S
DOP CALC AO VTI: 31.9 CM
DOP CALC LVOT AREA: 5.8 CM2
DOP CALC LVOT DIAMETER: 2.72 CM
DOP CALC LVOT PEAK VEL: 1.14 M/S
DOP CALC LVOT STROKE VOLUME: 121.38 CM3
DOP CALC MV VTI: 19.1 CM
DOP CALCLVOT PEAK VEL VTI: 20.9 CM
E WAVE DECELERATION TIME: 213.88 MSEC
E/A RATIO: 0.73
E/E' RATIO: 7.73 M/S
ECHO LV POSTERIOR WALL: 1.28 CM (ref 0.6–1.1)
EJECTION FRACTION: 55 %
EOSINOPHIL # BLD AUTO: 0 K/UL (ref 0–0.5)
EOSINOPHIL NFR BLD: 0.2 % (ref 0–8)
EOSINOPHIL NFR BLD: 0.2 % (ref 0–8)
EOSINOPHIL NFR BLD: 0.3 % (ref 0–8)
ERYTHROCYTE [DISTWIDTH] IN BLOOD BY AUTOMATED COUNT: 16.6 % (ref 11.5–14.5)
ERYTHROCYTE [DISTWIDTH] IN BLOOD BY AUTOMATED COUNT: 17 % (ref 11.5–14.5)
ERYTHROCYTE [DISTWIDTH] IN BLOOD BY AUTOMATED COUNT: 17.5 % (ref 11.5–14.5)
EST. GFR  (NO RACE VARIABLE): >60 ML/MIN/1.73 M^2
ESTIMATED AVG GLUCOSE: 123 MG/DL (ref 68–131)
FRACTIONAL SHORTENING: 26 % (ref 28–44)
GLUCOSE SERPL-MCNC: 89 MG/DL (ref 70–110)
HBA1C MFR BLD: 5.9 % (ref 4–5.6)
HCT VFR BLD AUTO: 26.2 % (ref 40–54)
HCT VFR BLD AUTO: 26.6 % (ref 40–54)
HCT VFR BLD AUTO: 29.1 % (ref 40–54)
HGB BLD-MCNC: 8.8 G/DL (ref 14–18)
HGB BLD-MCNC: 8.9 G/DL (ref 14–18)
HGB BLD-MCNC: 9.4 G/DL (ref 14–18)
IMM GRANULOCYTES # BLD AUTO: 0.05 K/UL (ref 0–0.04)
IMM GRANULOCYTES # BLD AUTO: 0.07 K/UL (ref 0–0.04)
IMM GRANULOCYTES # BLD AUTO: 0.15 K/UL (ref 0–0.04)
IMM GRANULOCYTES NFR BLD AUTO: 0.4 % (ref 0–0.5)
IMM GRANULOCYTES NFR BLD AUTO: 0.6 % (ref 0–0.5)
IMM GRANULOCYTES NFR BLD AUTO: 0.8 % (ref 0–0.5)
INTERVENTRICULAR SEPTUM: 1.24 CM (ref 0.6–1.1)
IVC DIAMETER: 1.19 CM
LA MAJOR: 4.99 CM
LA MINOR: 5.2 CM
LA WIDTH: 3.9 CM
LEFT ATRIUM SIZE: 3.85 CM
LEFT ATRIUM VOLUME INDEX MOD: 22.5 ML/M2
LEFT ATRIUM VOLUME INDEX: 31.1 ML/M2
LEFT ATRIUM VOLUME MOD: 47.09 CM3
LEFT ATRIUM VOLUME: 65 CM3
LEFT INTERNAL DIMENSION IN SYSTOLE: 3.97 CM (ref 2.1–4)
LEFT VENTRICLE DIASTOLIC VOLUME INDEX: 66.83 ML/M2
LEFT VENTRICLE DIASTOLIC VOLUME: 139.68 ML
LEFT VENTRICLE MASS INDEX: 134 G/M2
LEFT VENTRICLE SYSTOLIC VOLUME INDEX: 32.8 ML/M2
LEFT VENTRICLE SYSTOLIC VOLUME: 68.61 ML
LEFT VENTRICULAR INTERNAL DIMENSION IN DIASTOLE: 5.37 CM (ref 3.5–6)
LEFT VENTRICULAR MASS: 280.41 G
LV LATERAL E/E' RATIO: 5.8 M/S
LV SEPTAL E/E' RATIO: 11.6 M/S
LVOT MG: 2.3 MMHG
LVOT MV: 0.69 CM/S
LYMPHOCYTES # BLD AUTO: 2.2 K/UL (ref 1–4.8)
LYMPHOCYTES # BLD AUTO: 2.4 K/UL (ref 1–4.8)
LYMPHOCYTES # BLD AUTO: 2.5 K/UL (ref 1–4.8)
LYMPHOCYTES NFR BLD: 12.3 % (ref 18–48)
LYMPHOCYTES NFR BLD: 20.1 % (ref 18–48)
LYMPHOCYTES NFR BLD: 20.2 % (ref 18–48)
MCH RBC QN AUTO: 29.5 PG (ref 27–31)
MCH RBC QN AUTO: 29.6 PG (ref 27–31)
MCH RBC QN AUTO: 30 PG (ref 27–31)
MCHC RBC AUTO-ENTMCNC: 32.3 G/DL (ref 32–36)
MCHC RBC AUTO-ENTMCNC: 33.5 G/DL (ref 32–36)
MCHC RBC AUTO-ENTMCNC: 33.6 G/DL (ref 32–36)
MCV RBC AUTO: 88 FL (ref 82–98)
MCV RBC AUTO: 90 FL (ref 82–98)
MCV RBC AUTO: 91 FL (ref 82–98)
MONOCYTES # BLD AUTO: 0.7 K/UL (ref 0.3–1)
MONOCYTES # BLD AUTO: 0.7 K/UL (ref 0.3–1)
MONOCYTES # BLD AUTO: 0.9 K/UL (ref 0.3–1)
MONOCYTES NFR BLD: 5.1 % (ref 4–15)
MONOCYTES NFR BLD: 5.8 % (ref 4–15)
MONOCYTES NFR BLD: 6.1 % (ref 4–15)
MV A" WAVE DURATION": 110.37 MSEC
MV PEAK A VEL: 0.8 M/S
MV PEAK E VEL: 0.58 M/S
MV PEAK GRADIENT: 3 MMHG
MV STENOSIS PRESSURE HALF TIME: 62.02 MS
MV VALVE AREA BY CONTINUITY EQUATION: 6.36 CM2
MV VALVE AREA P 1/2 METHOD: 3.55 CM2
NEUTROPHILS # BLD AUTO: 14.7 K/UL (ref 1.8–7.7)
NEUTROPHILS # BLD AUTO: 8.6 K/UL (ref 1.8–7.7)
NEUTROPHILS # BLD AUTO: 9 K/UL (ref 1.8–7.7)
NEUTROPHILS NFR BLD: 72.7 % (ref 38–73)
NEUTROPHILS NFR BLD: 72.8 % (ref 38–73)
NEUTROPHILS NFR BLD: 81.3 % (ref 38–73)
NRBC BLD-RTO: 0 /100 WBC
PISA AR MAX VEL: 2.88 M/S
PISA TR MAX VEL: 2.74 M/S
PLATELET # BLD AUTO: 244 K/UL (ref 150–450)
PLATELET # BLD AUTO: 251 K/UL (ref 150–450)
PLATELET # BLD AUTO: 294 K/UL (ref 150–450)
PMV BLD AUTO: 9.3 FL (ref 9.2–12.9)
PMV BLD AUTO: 9.4 FL (ref 9.2–12.9)
PMV BLD AUTO: 9.5 FL (ref 9.2–12.9)
POCT GLUCOSE: 101 MG/DL (ref 70–110)
POCT GLUCOSE: 163 MG/DL (ref 70–110)
POCT GLUCOSE: 86 MG/DL (ref 70–110)
POCT GLUCOSE: 91 MG/DL (ref 70–110)
POTASSIUM SERPL-SCNC: 4.4 MMOL/L (ref 3.5–5.1)
PULM VEIN S/D RATIO: 1.56
PV MV: 0.76 M/S
PV PEAK D VEL: 0.36 M/S
PV PEAK GRADIENT: 4 MMHG
PV PEAK S VEL: 0.56 M/S
PV PEAK VELOCITY: 1.01 M/S
RA MAJOR: 4.84 CM
RA PRESSURE ESTIMATED: 3 MMHG
RA WIDTH: 3.56 CM
RBC # BLD AUTO: 2.97 M/UL (ref 4.6–6.2)
RBC # BLD AUTO: 2.97 M/UL (ref 4.6–6.2)
RBC # BLD AUTO: 3.19 M/UL (ref 4.6–6.2)
RIGHT VENTRICULAR END-DIASTOLIC DIMENSION: 3.63 CM
RV TB RVSP: 6 MMHG
RV TISSUE DOPPLER FREE WALL SYSTOLIC VELOCITY 1 (APICAL 4 CHAMBER VIEW): 15.04 CM/S
SINUS: 3.87 CM
SODIUM SERPL-SCNC: 134 MMOL/L (ref 136–145)
STJ: 3.77 CM
TDI LATERAL: 0.1 M/S
TDI SEPTAL: 0.05 M/S
TDI: 0.08 M/S
TR MAX PG: 30 MMHG
TRICUSPID ANNULAR PLANE SYSTOLIC EXCURSION: 1.86 CM
TV REST PULMONARY ARTERY PRESSURE: 33 MMHG
WBC # BLD AUTO: 11.76 K/UL (ref 3.9–12.7)
WBC # BLD AUTO: 12.37 K/UL (ref 3.9–12.7)
WBC # BLD AUTO: 18.1 K/UL (ref 3.9–12.7)
Z-SCORE OF LEFT VENTRICULAR DIMENSION IN END DIASTOLE: -1.83
Z-SCORE OF LEFT VENTRICULAR DIMENSION IN END SYSTOLE: 0.06

## 2024-01-08 PROCEDURE — 37000009 HC ANESTHESIA EA ADD 15 MINS: Mod: HCNC | Performed by: INTERNAL MEDICINE

## 2024-01-08 PROCEDURE — 27201012 HC FORCEPS, HOT/COLD, DISP: Mod: HCNC | Performed by: INTERNAL MEDICINE

## 2024-01-08 PROCEDURE — 94640 AIRWAY INHALATION TREATMENT: CPT | Mod: HCNC

## 2024-01-08 PROCEDURE — 88341 IMHCHEM/IMCYTCHM EA ADD ANTB: CPT | Mod: 59,HCNC | Performed by: PATHOLOGY

## 2024-01-08 PROCEDURE — 99223 1ST HOSP IP/OBS HIGH 75: CPT | Mod: HCNC,,, | Performed by: INTERNAL MEDICINE

## 2024-01-08 PROCEDURE — 80048 BASIC METABOLIC PNL TOTAL CA: CPT | Mod: HCNC | Performed by: HOSPITALIST

## 2024-01-08 PROCEDURE — 94761 N-INVAS EAR/PLS OXIMETRY MLT: CPT | Mod: HCNC

## 2024-01-08 PROCEDURE — 83036 HEMOGLOBIN GLYCOSYLATED A1C: CPT | Mod: HCNC | Performed by: HOSPITALIST

## 2024-01-08 PROCEDURE — 43239 EGD BIOPSY SINGLE/MULTIPLE: CPT | Mod: HCNC | Performed by: INTERNAL MEDICINE

## 2024-01-08 PROCEDURE — 88342 IMHCHEM/IMCYTCHM 1ST ANTB: CPT | Mod: HCNC | Performed by: PATHOLOGY

## 2024-01-08 PROCEDURE — 25000003 PHARM REV CODE 250: Mod: HCNC | Performed by: STUDENT IN AN ORGANIZED HEALTH CARE EDUCATION/TRAINING PROGRAM

## 2024-01-08 PROCEDURE — 63600175 PHARM REV CODE 636 W HCPCS: Mod: HCNC | Performed by: HOSPITALIST

## 2024-01-08 PROCEDURE — 99406 BEHAV CHNG SMOKING 3-10 MIN: CPT | Mod: S$GLB,,,

## 2024-01-08 PROCEDURE — 37000008 HC ANESTHESIA 1ST 15 MINUTES: Mod: HCNC | Performed by: INTERNAL MEDICINE

## 2024-01-08 PROCEDURE — C9113 INJ PANTOPRAZOLE SODIUM, VIA: HCPCS | Mod: HCNC | Performed by: HOSPITALIST

## 2024-01-08 PROCEDURE — 36415 COLL VENOUS BLD VENIPUNCTURE: CPT | Mod: HCNC,XB | Performed by: HOSPITALIST

## 2024-01-08 PROCEDURE — 88305 TISSUE EXAM BY PATHOLOGIST: CPT | Mod: HCNC | Performed by: PATHOLOGY

## 2024-01-08 PROCEDURE — 88342 IMHCHEM/IMCYTCHM 1ST ANTB: CPT | Mod: 26,HCNC,, | Performed by: PATHOLOGY

## 2024-01-08 PROCEDURE — D9220A PRA ANESTHESIA: Mod: CRNA,,, | Performed by: NURSE ANESTHETIST, CERTIFIED REGISTERED

## 2024-01-08 PROCEDURE — D9220A PRA ANESTHESIA: Mod: ANES,,, | Performed by: ANESTHESIOLOGY

## 2024-01-08 PROCEDURE — 25000003 PHARM REV CODE 250: Mod: HCNC | Performed by: NURSE ANESTHETIST, CERTIFIED REGISTERED

## 2024-01-08 PROCEDURE — 21400001 HC TELEMETRY ROOM: Mod: HCNC

## 2024-01-08 PROCEDURE — 25000242 PHARM REV CODE 250 ALT 637 W/ HCPCS: Mod: HCNC | Performed by: HOSPITALIST

## 2024-01-08 PROCEDURE — 0DB68ZX EXCISION OF STOMACH, VIA NATURAL OR ARTIFICIAL OPENING ENDOSCOPIC, DIAGNOSTIC: ICD-10-PCS | Performed by: INTERNAL MEDICINE

## 2024-01-08 PROCEDURE — 88341 IMHCHEM/IMCYTCHM EA ADD ANTB: CPT | Mod: 26,HCNC,, | Performed by: PATHOLOGY

## 2024-01-08 PROCEDURE — 25000003 PHARM REV CODE 250: Mod: HCNC | Performed by: HOSPITALIST

## 2024-01-08 PROCEDURE — 85025 COMPLETE CBC W/AUTO DIFF WBC: CPT | Mod: 91,HCNC | Performed by: HOSPITALIST

## 2024-01-08 PROCEDURE — 88305 TISSUE EXAM BY PATHOLOGIST: CPT | Mod: 26,HCNC,, | Performed by: PATHOLOGY

## 2024-01-08 RX ORDER — PROPOFOL 10 MG/ML
VIAL (ML) INTRAVENOUS
Status: DISCONTINUED | OUTPATIENT
Start: 2024-01-08 | End: 2024-01-08

## 2024-01-08 RX ORDER — LIDOCAINE 50 MG/G
1 PATCH TOPICAL
Status: DISCONTINUED | OUTPATIENT
Start: 2024-01-08 | End: 2024-01-10 | Stop reason: HOSPADM

## 2024-01-08 RX ORDER — MUPIROCIN 20 MG/G
OINTMENT TOPICAL 2 TIMES DAILY
Status: DISCONTINUED | OUTPATIENT
Start: 2024-01-08 | End: 2024-01-10 | Stop reason: HOSPADM

## 2024-01-08 RX ORDER — FOLIC ACID 1 MG/1
1 TABLET ORAL DAILY
Status: DISCONTINUED | OUTPATIENT
Start: 2024-01-09 | End: 2024-01-10 | Stop reason: HOSPADM

## 2024-01-08 RX ORDER — FOLIC ACID 5 MG/ML
1 INJECTION, SOLUTION INTRAMUSCULAR; INTRAVENOUS; SUBCUTANEOUS DAILY
Status: DISCONTINUED | OUTPATIENT
Start: 2024-01-08 | End: 2024-01-08

## 2024-01-08 RX ORDER — HYDROCODONE BITARTRATE AND ACETAMINOPHEN 5; 325 MG/1; MG/1
1 TABLET ORAL EVERY 6 HOURS PRN
Status: DISCONTINUED | OUTPATIENT
Start: 2024-01-08 | End: 2024-01-10 | Stop reason: HOSPADM

## 2024-01-08 RX ORDER — LANOLIN ALCOHOL/MO/W.PET/CERES
1000 CREAM (GRAM) TOPICAL DAILY
Status: DISCONTINUED | OUTPATIENT
Start: 2024-01-08 | End: 2024-01-10 | Stop reason: HOSPADM

## 2024-01-08 RX ADMIN — FOLIC ACID 1 MG: 5 INJECTION, SOLUTION INTRAMUSCULAR; INTRAVENOUS; SUBCUTANEOUS at 09:01

## 2024-01-08 RX ADMIN — Medication 30 MG: at 01:01

## 2024-01-08 RX ADMIN — PIPERACILLIN AND TAZOBACTAM 4.5 G: 4; .5 INJECTION, POWDER, LYOPHILIZED, FOR SOLUTION INTRAVENOUS; PARENTERAL at 12:01

## 2024-01-08 RX ADMIN — PANTOPRAZOLE SODIUM 40 MG: 40 INJECTION, POWDER, LYOPHILIZED, FOR SOLUTION INTRAVENOUS at 09:01

## 2024-01-08 RX ADMIN — PIPERACILLIN AND TAZOBACTAM 4.5 G: 4; .5 INJECTION, POWDER, LYOPHILIZED, FOR SOLUTION INTRAVENOUS; PARENTERAL at 09:01

## 2024-01-08 RX ADMIN — IPRATROPIUM BROMIDE AND ALBUTEROL SULFATE 3 ML: 2.5; .5 SOLUTION RESPIRATORY (INHALATION) at 07:01

## 2024-01-08 RX ADMIN — MUPIROCIN: 20 OINTMENT TOPICAL at 09:01

## 2024-01-08 RX ADMIN — PIPERACILLIN AND TAZOBACTAM 4.5 G: 4; .5 INJECTION, POWDER, LYOPHILIZED, FOR SOLUTION INTRAVENOUS; PARENTERAL at 04:01

## 2024-01-08 RX ADMIN — LIDOCAINE 1 PATCH: 50 PATCH CUTANEOUS at 09:01

## 2024-01-08 RX ADMIN — SODIUM CHLORIDE: 0.9 INJECTION, SOLUTION INTRAVENOUS at 01:01

## 2024-01-08 RX ADMIN — CYANOCOBALAMIN TAB 1000 MCG 1000 MCG: 1000 TAB at 09:01

## 2024-01-08 RX ADMIN — Medication 60 MG: at 01:01

## 2024-01-08 NOTE — NURSING
Pt back from endo. Pt attached to monitor.  IV gtts restarted.   [email or web address]  Report received from Marlo MCKEE.  All questions answered.

## 2024-01-08 NOTE — SUBJECTIVE & OBJECTIVE
- he endorses fatigue, poor PO intake due to taste changes, unintentional weight loss, upper abdominal pain.      Oncology Treatment Plan:   [No matching plan found]    Medications:  Continuous Infusions:  Scheduled Meds:   albuterol-ipratropium  3 mL Nebulization Q6H WAKE    cyanocobalamin  1,000 mcg Oral Daily    [START ON 1/9/2024] folic acid  1 mg Oral Daily    LIDOcaine  1 patch Transdermal Q24H    mupirocin   Nasal BID    nicotine  1 patch Transdermal Daily    pantoprazole  40 mg Intravenous BID    piperacillin-tazobactam (Zosyn) IV (PEDS and ADULTS) (extended infusion is not appropriate)  4.5 g Intravenous Q8H     PRN Meds:0.9%  NaCl infusion (for blood administration), 0.9%  NaCl infusion (for blood administration), sodium chloride 0.9%, dextrose 10%, dextrose 10%, glucagon (human recombinant), HYDROcodone-acetaminophen, insulin aspart U-100     Review of patient's allergies indicates:  No Known Allergies     History reviewed. No pertinent past medical history.  History reviewed. No pertinent surgical history.  Family History    Family history is unknown by patient.       Tobacco Use    Smoking status: Every Day     Current packs/day: 1.00     Average packs/day: 1 pack/day for 58.0 years (58.0 ttl pk-yrs)     Types: Cigarettes     Start date: 1966    Smokeless tobacco: Not on file   Substance and Sexual Activity    Alcohol use: Not Currently    Drug use: Never    Sexual activity: Not on file       Review of Systems   Constitutional:  Positive for fatigue and unexpected weight change.   HENT:  Negative for sore throat.    Eyes:  Negative for visual disturbance.   Respiratory:  Negative for shortness of breath.    Cardiovascular:  Negative for chest pain.   Gastrointestinal:  Positive for abdominal pain.   Genitourinary:  Negative for dysuria.   Musculoskeletal:  Negative for back pain.   Skin:  Negative for rash.   Neurological:  Positive for weakness. Negative for headaches.   Hematological:  Negative for  adenopathy.   Psychiatric/Behavioral:  The patient is not nervous/anxious.      Objective:     Vital Signs (Most Recent):  Temp: 97.9 °F (36.6 °C) (01/08/24 1328)  Pulse: 74 (01/08/24 1328)  Resp: 18 (01/08/24 1328)  BP: 129/76 (01/08/24 1328)  SpO2: 98 % (01/08/24 1328) Vital Signs (24h Range):  Temp:  [97.9 °F (36.6 °C)-98.7 °F (37.1 °C)] 97.9 °F (36.6 °C)  Pulse:  [] 74  Resp:  [17-90] 18  SpO2:  [90 %-99 %] 98 %  BP: ()/(48-80) 129/76     Weight: 88.9 kg (196 lb)  Body mass index is 27.34 kg/m².  Body surface area is 2.11 meters squared.      Intake/Output Summary (Last 24 hours) at 1/8/2024 1522  Last data filed at 1/8/2024 1324  Gross per 24 hour   Intake 646.32 ml   Output 1677 ml   Net -1030.68 ml        Physical Exam  Vitals and nursing note reviewed.   Constitutional:       Appearance: He is well-developed.      Comments: fatigued   HENT:      Head: Normocephalic and atraumatic.   Eyes:      Pupils: Pupils are equal, round, and reactive to light.   Cardiovascular:      Rate and Rhythm: Normal rate and regular rhythm.   Pulmonary:      Effort: Pulmonary effort is normal.      Breath sounds: Normal breath sounds.   Abdominal:      General: Bowel sounds are normal.      Palpations: Abdomen is soft.   Musculoskeletal:         General: Normal range of motion.      Cervical back: Normal range of motion and neck supple.   Skin:     General: Skin is warm and dry.   Neurological:      Mental Status: He is alert and oriented to person, place, and time.   Psychiatric:         Behavior: Behavior normal.         Thought Content: Thought content normal.         Judgment: Judgment normal.          Significant Labs:   CBC:   Recent Labs   Lab 01/08/24  0013 01/08/24  0553 01/08/24  1211   WBC 18.10* 12.37 11.76   HGB 8.9* 8.8* 9.4*   HCT 26.6* 26.2* 29.1*    251 294    and CMP:   Recent Labs   Lab 01/07/24  0928 01/07/24  1756 01/08/24  0553   * 132* 134*   K 4.1 5.1 4.4    106 107   CO2 13*  19* 21*   * 115* 89   BUN 25* 36* 38*   CREATININE 1.2 0.9 0.8   CALCIUM 8.6* 8.1* 8.4*   PROT 4.9* 4.3*  --    ALBUMIN 2.3* 2.1*  --    BILITOT 0.3 0.2  --    ALKPHOS 59 49*  --    AST 10 14  --    ALT 5* 10  --    ANIONGAP 19* 7* 6*       Diagnostic Results:  Upper GI endoscopy (1/8/24):   - Extrinsic narrowing of the GE junction attributed to malignant compression from a gastric adenocarcinoma   - Large (5cm x 3cm), penetrating ulcer of the gastric cardia and proximal body which is typical for adenocarcinoma although lymphoma is also possible - biopsies obtained     CT abdomen/pelvis (1/7/24): I have personally reviewed the images  Findings suggestive of a large lesser curvature gastric ulcer with erosion into and inflammation of the adjacent pancreatic tail/pancreatitis.  Blood products are suspected within the gastric lumen.     Nonspecific liquid large bowel contents     Abdominal aortic aneurysm      CTA chest (1/7/24): I have personally reviewed the images  No evidence for aortic dissection or pulmonary embolus.     Abnormal appearance of the stomach with abnormal hyperdensity noted and questionable air within the stomach walls.  Component of emphysematous gastritis not excluded.  Additionally, there is stranding of the fat along the posterior in the inferior margin of the stomach that extends towards the pancreas for which pancreatitis cannot be excluded.  Further evaluation with a dedicated CT scan of the abdomen and pelvis with IV and oral contrast material is recommended.     At least 2 indeterminate hypodensities are seen in the left hepatic lobe.  This can be better evaluated with CT scan of the abdomen.

## 2024-01-08 NOTE — PLAN OF CARE
The sw met with the pt to complete the assessment. The pt lives in [place] along with his 4 children and his wife Lucinda Nance who has dementia. The pt list his dtr Kevin Nance [phone] as his emergency contact. The pt's independent with his ADL's and doesn't use dme. The pt still drives but is unsure who will transport him home at d/c. The pt doesn't have a PCP and is agreeable to go to [facility] next door to establish Primary Care b/c he hasn't seen a dr in over 35+ years. The sw scheduled the hsp f/u and there pt was agreeable to the date and time. The sw completed the white board in the pt's room with her name and contact info. The sw encouraged him to call if he has any further questions or concerns. The sw will continue to follow the pt throughout his transitions of care and will assist with any d/c needs.     [facility] - Intensive Care  Initial Discharge Assessment       Primary Care Provider: No primary care provider on file.    Admission Diagnosis: Shortness of breath [R06.02]  Emphysematous gastritis [K29.60]    Admission Date: 1/7/2024  Expected Discharge Date:     Transition of Care Barriers: (P) None    Payor: [employer] MANAGED MEDICARE / Plan: [employer] MEDICARE HMO / Product Type: Capitation /     Extended Emergency Contact Information  Primary Emergency Contact: Lucinda Jaime  Address: [address] [place] [address] [place] [facility]  Home Phone: [phone]  Mobile Phone: [phone]  Relation: Spouse    Discharge Plan A: (P) Home with family  Discharge Plan B: (P) Home Health      [id number] [place] - [address] [facility], [place]  [address] [address]  Phone: [phone] Fax: [phone]      Initial Assessment (most recent)       Adult Discharge Assessment - 01/08/24 1312          Discharge Assessment    Assessment Type Discharge Planning Assessment (P)      Confirmed/corrected address, phone number  and insurance Yes (P)      Confirmed Demographics Correct on Facesheet (P)      Source of Information patient (P)      When was your last doctors appointment? -- (P)    35-40 years ago    Communicated DAVID with patient/caregiver Date not available/Unable to determine (P)      Reason For Admission Hypotension (P)      People in Home child(memo), adult;spouse (P)      Do you expect to return to your current living situation? Yes (P)      Do you have help at home or someone to help you manage your care at home? Yes (P)      Who are your caregiver(s) and their phone number(s)? Brittaney Eaton(dtr)[phone] (P)      Prior to hospitilization cognitive status: Alert/Oriented (P)      Current cognitive status: Alert/Oriented (P)      Walking or Climbing Stairs Difficulty no (P)      Dressing/Bathing Difficulty no (P)      Do you have any problems with: Errands/Grocery (P)      Home Accessibility wheelchair accessible (P)      Home Layout Able to live on 1st floor (P)      Equipment Currently Used at Home none (P)      Readmission within 30 days? No (P)      Patient currently being followed by outpatient case management? No (P)      Do you currently have service(s) that help you manage your care at home? No (P)      Do you take prescription medications? No (P)      Do you have prescription coverage? Yes (P)      Coverage [employer] (P)      Do you have any problems affording any of your prescribed medications? TBD (P)      Is the patient taking medications as prescribed? -- (P)    the pt's not on any prescription meds currently    Who is going to help you get home at discharge? pt states he's unsure (P)      How do you get to doctors appointments? car, drives self (P)      Are you on dialysis? No (P)      Do you take coumadin? No (P)      Discharge Plan A Home with family (P)      Discharge Plan B Home Health (P)      DME Needed Upon Discharge  other (see comments) (P)    TBD    Discharge Plan discussed with: Patient  (P)      Transition of Care Barriers None (P)         Physical Activity    On average, how many days per week do you engage in moderate to strenuous exercise (like a brisk walk)? 0 days (P)      On average, how many minutes do you engage in exercise at this level? 0 min (P)         Housing Stability    In the last 12 months, how many places have you lived? 1 (P)      In the last 12 months, was there a time when you did not have a steady place to sleep or slept in a shelter (including now)? No (P)         Transportation Needs    In the past 12 months, has lack of transportation kept you from medical appointments or from getting medications? No (P)      In the past 12 months, has lack of transportation kept you from meetings, work, or from getting things needed for daily living? No (P)         Food Insecurity    Within the past 12 months, you worried that your food would run out before you got the money to buy more. Never true (P)      Within the past 12 months, the food you bought just didn't last and you didn't have money to get more. Never true (P)         Stress    Do you feel stress - tense, restless, nervous, or anxious, or unable to sleep at night because your mind is troubled all the time - these days? Very much (P)         Social Connections    In a typical week, how many times do you talk on the phone with family, friends, or neighbors? More than three times a week (P)      How often do you get together with friends or relatives? More than three times a week (P)      Are you , , , , never , or living with a partner?  (P)         Alcohol Use    Q1: How often do you have a drink containing alcohol? Never (P)      Q2: How many drinks containing alcohol do you have on a typical day when you are drinking? Patient does not drink (P)      Q3: How often do you have six or more drinks on one occasion? Never (P)         OTHER    Name(s) of People in Home Brittaney  Kristy(dtr)[phone]/Lucinda Wagner(wife) (P)

## 2024-01-08 NOTE — ANESTHESIA PREPROCEDURE EVALUATION
01/08/2024  [facility]  Anesthesia Pre-Operative Evaluation     Patient Name: Pino Nance  YOB: 1950  MRN: [id number]    SUBJECTIVE:     Pre-operative evaluation for Procedure(s) (LRB):  EGD (ESOPHAGOGASTRODUODENOSCOPY) (N/A)     01/08/2024    Pino Nance is a 73 y.o. male   Patient now presents for the above procedure(s).    Patient Active Problem List   Diagnosis    Hematemesis with nausea    Hypotension    Emphysematous gastritis    Anemia    Tobacco abuse    Lung nodules    Hyperglycemia    Lactic acidosis    Sepsis    Emphysema lung    Weight loss       Review of patient's allergies indicates:  No Known Allergies    Current Inpatient Medications:   albuterol-ipratropium  3 mL Nebulization Q6H WAKE    nicotine  1 patch Transdermal Daily    pantoprazole  40 mg Intravenous BID    piperacillin-tazobactam (Zosyn) IV (PEDS and ADULTS) (extended infusion is not appropriate)  4.5 g Intravenous Q8H    vancomycin (VANCOCIN) IV (PEDS and ADULTS)  2,500 mg Intravenous Q24H       No current facility-administered medications on file prior to encounter.     Current Outpatient Medications on File Prior to Encounter   Medication Sig Dispense Refill    ibuprofen (ADVIL,MOTRIN) 200 MG tablet Take 200 mg by mouth every 6 (six) hours as needed for Pain.         History reviewed. No pertinent surgical history.    OBJECTIVE:     Vital Signs Range (Last 24H):  Temp:  [35.8 °C (96.4 °F)-37.1 °C (98.7 °F)]   Pulse:  []   Resp:  [16-90]   BP: ()/(40-80)   SpO2:  [90 %-100 %]       Significant Labs:  Lab Results   Component Value Date    WBC 18.10 (H) 01/08/2024    HGB 8.9 (L) 01/08/2024    HCT 26.6 (L) 01/08/2024     01/08/2024    ALT 10 01/07/2024    AST 14 01/07/2024     (L) 01/07/2024    K 5.1 01/07/2024     01/07/2024    CREATININE 0.9 01/07/2024    BUN  36 (H) 01/07/2024    CO2 19 (L) 01/07/2024    TSH 2.372 01/07/2024    INR 1.0 07/16/2012       Diagnostic Studies: No relevant studies.    EKG:   Results for orders placed or performed during the hospital encounter of 07/16/12   EKG 12-lead    Collection Time: 07/16/12  7:38 AM    Narrative    Test Reason : 535.01  Vent. Rate : 085 BPM     Atrial Rate : 085 BPM     P-R Int : 142 ms          QRS Dur : 080 ms      QT Int : 354 ms       P-R-T Axes : 048 -04 012 degrees     QTc Int : 421 ms    Normal sinus rhythm  Normal ECG  No previous ECGs available  Confirmed by Mundo Tarango MD ([id number]) on 7/16/2012 3:22:31 PM    Referred By:             Overread By: Mundo Tarango MD       ASSESSMENT/PLAN:           Pre-op Assessment    I have reviewed the Patient Summary Reports.     I have reviewed the Nursing Notes. I have reviewed the NPO Status.   I have reviewed the Medications.     Review of Systems  Anesthesia Hx:             Denies Family Hx of Anesthesia complications.    Denies Personal Hx of Anesthesia complications.                    Social:  Smoker       Hematology/Oncology:       -- Anemia:                  Current/Recent Cancer.                EENT/Dental:  EENT/Dental Normal           Cardiovascular:     Denies Pacemaker.     Denies CAD.    Denies CABG/stent.                                     Pulmonary:   COPD                     Hepatic/GI:   PUD,     Hemorrhagic shock s/p 1u of pRBC and ~4L of crystalloid.  BP and HR now stable.  Per CT scan concern for bleeding gastric ulcer          Neurological:    Neuromuscular Disease,       Generalized weakness                                 Physical Exam  General: Alert and Cooperative    Airway:  Mallampati: III   Mouth Opening: Normal  TM Distance: Normal  Tongue: Normal  Neck ROM: Normal ROM    Dental:  Periodontal disease        Anesthesia Plan  Type of Anesthesia, risks & benefits discussed:    Anesthesia Type: Gen Natural Airway  Intra-op Monitoring Plan:  Standard ASA Monitors  Post Op Pain Control Plan: multimodal analgesia  Induction:  IV  Informed Consent: Informed consent signed with the Patient and all parties understand the risks and agree with anesthesia plan.  All questions answered.   ASA Score: 3  Day of Surgery Review of History & Physical: H&P Update referred to the surgeon/provider.    Ready For Surgery From Anesthesia Perspective.     .

## 2024-01-08 NOTE — PLAN OF CARE
Recommendations  Recommendation:   1. Continue clear liquid diet and advance as tolerated   2. Add Boost Breeze BID   3. Monitor weight and labs    Goals: Pt will consume 50-75% of meals by RD follow up    Nutrition Goal Status: new  Communication of RD Recs: other (comment) (POC)

## 2024-01-08 NOTE — ASSESSMENT & PLAN NOTE
-noted incidentally on chest CTA  -initiated on broad-spectrum IV antibiotics for now  -dedicated CT abdomen with large lesser curvature gastric ulcer with erosion into and inflammation of adjacent pancreatic tail/panreatitis

## 2024-01-08 NOTE — TRANSFER OF CARE
"Anesthesia Transfer of Care Note    Patient: Pino Nance    Procedure(s) Performed: Procedure(s) (LRB):  EGD (ESOPHAGOGASTRODUODENOSCOPY) (N/A)    Patient location: PACU    Anesthesia Type: general    Transport from OR: Continuous SpO2 monitoring in transport. Continuous ECG monitoring in transport. Transported from OR on 2-3 L/min O2 by NC with adequate spontaneous ventilation    Post pain: adequate analgesia    Post assessment: no apparent anesthetic complications    Post vital signs: stable    Level of consciousness: awake and alert    Nausea/Vomiting: no nausea/vomiting    Complications: none    Transfer of care protocol was followed    Last vitals: Visit Vitals  /76 (BP Location: Left arm, Patient Position: Lying)   Pulse 74   Temp 36.6 °C (97.9 °F) (Temporal)   Resp 18   Ht 5' 11" (1.803 m)   Wt 88.9 kg (196 lb)   SpO2 98%   BMI 27.34 kg/m²     "

## 2024-01-08 NOTE — PROGRESS NOTES
[facility]                   Pharmacy  Pharmacy Vancomycin/AG Sign-off    Therapy with vancomycin completed and/or consult discontinued by provider.  Pharmacy will sign off, please re-consult as needed. Thank you for allowing us to participate in this patient's care.     Alysia Acosta, PharmD    [phone]

## 2024-01-08 NOTE — PROGRESS NOTES
[facility] - Intensive Care  Adult Nutrition  Progress Note    SUMMARY       Recommendations  Recommendation:   1. Continue clear liquid diet and advance as tolerated   2. Add Boost Breeze BID   3. Monitor weight and labs    Goals: Pt will consume 50-75% of meals by RD follow up    Nutrition Goal Status: new  Communication of RD Recs: other (comment) (POC)    Assessment and Plan  Nutrition Problem  Inadequate energy intake    Related to (etiology):   Dx related symptoms    Signs and Symptoms (as evidenced by):   Clear liquid diet   Previous NPO status     Interventions:  Collaboration with other providers  Commercial beverage    Nutrition Diagnosis Status:   New    Reason for Assessment  Reason For Assessment: identified at risk by screening criteria (3)  Diagnosis: pulmonary disease (hypotension)  Relevant Medical History: No current medical history  Interdisciplinary Rounds: did not attend  General Information Comments: Pt is currently on a clear liquid diet. Per chart review pt has not been to see a MD in 40 years. Pt has no past medical history or weight history. Per chart review pt has been having appetite changes and weight changes. Pt has been smoking 1 ppd x 58 years. Per chart review pt has abdominal pain, N/V, and bloddy stool. Johann- 20/skin intact. No meal intake noted b/c pt was previously NPO for a EGD procedure. Unable to conduct NFPE at this time d/t remote coverage being provided. Pt also had biopsies done on 1/8 and may have cancer.  Nutrition Discharge Planning: d/c diet to be determined    Nutrition Risk Screen  Nutrition Risk Screen: no indicators present    Nutrition/Diet History  Patient Reported Diet/Restrictions/Preferences: no oral intake  Spiritual, Cultural Beliefs, Hindu Practices, Values that Affect Care:  (unable to assess at this time)  Factors Affecting Nutritional Intake: abdominal pain, nausea/vomiting    Anthropometrics  Temp: 97.9 °F (36.6 °C)  Height Method: Stated  Height: 5'  "11" (180.3 cm)  Height (inches): 71 in  Weight Method: Bed Scale  Weight: 88.9 kg (195 lb 15.8 oz)  Weight (lb): 195.99 lb  Ideal Body Weight (IBW), Male: 172 lb  % Ideal Body Weight, Male (lb): 113.95 %  BMI (Calculated): 27.3  Usual Body Weight (UBW), kg:  (No recent weight history b/c pt hasn ot been to see MD in 40 years)     Lab/Procedures/Meds  Pertinent Labs Reviewed: reviewed  Pertinent Labs Comments: hgb 9.4L, hct 29.1L Na 134L, BUN 38H, Ca 8.4L  Pertinent Medications Reviewed: reviewed  Pertinent Medications Comments: cyanocobalamin, pantoprazole    Estimated/Assessed Needs  Weight Used For Calorie Calculations: 88.9 kg (195 lb 15.8 oz)  Energy Calorie Requirements (kcal): 2400 kcals (27 kcals/kg)  Energy Need Method: Kcal/kg  Protein Requirements: 89g (1g/kg)  Weight Used For Protein Calculations: 88.9 kg (195 lb 15.8 oz)     Estimated Fluid Requirement Method: RDA Method  RDA Method (mL): 2400     Nutrition Prescription Ordered  Current Diet Order: Clear liquid diet    Evaluation of Received Nutrient/Fluid Intake  I/O: 173/250  Energy Calories Required: not meeting needs  Protein Required: not meeting needs  Fluid Required: not meeting needs  Comments: LBM-1/8  Tolerance: not tolerating  % Intake of Estimated Energy Needs: Other: Previously NPO  % Meal Intake: Other: Previously NPO    Nutrition Risk  Level of Risk/Frequency of Follow-up: moderate (2x/weekly)     Monitor and Evaluation  Food and Nutrient Intake: energy intake, food and beverage intake  Food and Nutrient Adminstration: diet order  Anthropometric Measurements: weight, weight change, body mass index  Biochemical Data, Medical Tests and Procedures: electrolyte and renal panel, gastrointestinal profile, lipid profile, inflammatory profile     Nutrition Follow-Up  RD Follow-up?: Yes      "

## 2024-01-08 NOTE — OR NURSING
Patient scheduled for EGD exam today, chart reviewed and report taken from Shey (nurse).  NPO since MN, IV in place and able to sign necessary consents. Post blood transfusion 1 unit PRBC's. BMP in process.

## 2024-01-08 NOTE — PLAN OF CARE
GI Plan of Care    EGD with large ulcerated mass in gastric cardia suspicious for malignancy. Biopsies taken. Called daughter (Kevin) and relayed findings.    -Biopsies pending  -Continue PPI BID, transition to oral on discharge  -See endoscopy report from today

## 2024-01-08 NOTE — ASSESSMENT & PLAN NOTE
- presents with 1 episode mild hematemesis; given findings on CT, concern for malignancy vs PUD vs emphysematous gastritis  - Pathway initiated  - 2 LBIV  - initiated on protonix 40mg IV bid  - hold anticoagulation  - trend CBC tid for now; transfuse Hb <7  - NPO  - GI consulted, plan for EGD today

## 2024-01-08 NOTE — PROGRESS NOTES
[facility] Medicine  Progress Note    Patient Name: Pino Nance  MRN: [id number]  Patient Class: IP- Inpatient   Admission Date: 1/7/2024  Length of Stay: 1 days  Attending Physician: Davin Hinojosa,*  Primary Care Provider: Karla Luevano MD        Subjective:     Principal Problem:Hypotension        HPI:  Mr. Nance is a 73-year-old man with tobacco use disorder who presents with episode of hematemesis and lightheadedness.  He states that he has not seen a doctor in almost 40 years and is on no home medications including NSAIDs.  States that over the past several months he has been having stomach pain associated with decreased appetite.  Also reports the food does not taste as good as normal and that he has had a bad taste in his mouth that stays there over this time.  States that he used to be a size 52 waist but that now his pants are no longer fitting.  He does not weigh himself so unsure how much weight he has lost.    Overview/Hospital Course:  No notes on file    Interval History:  Doing well today with improvement in blood pressure.  Did have 1 dark bowel movement overnight but otherwise most recent blood counts have been stable.  Awaiting EGD today.  Should be able to step-down out of ICU.    Review of Systems  Objective:     Vital Signs (Most Recent):  Temp: 98.6 °F (37 °C) (01/08/24 0758)  Pulse: 82 (01/08/24 0800)  Resp: (!) 24 (01/08/24 0800)  BP: 127/79 (01/08/24 0800)  SpO2: 96 % (01/08/24 0800) Vital Signs (24h Range):  Temp:  [97.6 °F (36.4 °C)-98.7 °F (37.1 °C)] 98.6 °F (37 °C)  Pulse:  [] 82  Resp:  [16-90] 24  SpO2:  [90 %-100 %] 96 %  BP: ()/(48-80) 127/79     Weight: 89 kg (196 lb 3.4 oz)  Body mass index is 27.37 kg/m².    Intake/Output Summary (Last 24 hours) at 1/8/2024 1003  Last data filed at 1/8/2024 0630  Gross per 24 hour   Intake 1573.88 ml   Output 1427 ml   Net 146.88 ml         Physical Exam  Vitals reviewed.   Constitutional:        General: He is not in acute distress.     Appearance: He is well-developed. He is not diaphoretic.   HENT:      Head: Normocephalic and atraumatic.      Nose: Nose normal.      Mouth/Throat:      Comments: Poor dentition  Eyes:      General: No scleral icterus.     Pupils: Pupils are equal, round, and reactive to light.   Neck:      Vascular: No JVD.      Trachea: No tracheal deviation.   Cardiovascular:      Rate and Rhythm: Normal rate and regular rhythm.      Heart sounds: Normal heart sounds. No murmur heard.     No friction rub. No gallop.   Pulmonary:      Effort: Pulmonary effort is normal. No respiratory distress.      Breath sounds: Normal breath sounds.   Abdominal:      General: There is no distension.      Palpations: Abdomen is soft. There is no mass.      Tenderness: There is abdominal tenderness (Epigastric).      Hernia: No hernia is present.   Musculoskeletal:         General: No deformity.      Cervical back: Normal range of motion.   Skin:     General: Skin is warm and dry.      Findings: No rash.   Neurological:      Mental Status: He is alert and oriented to person, place, and time.   Psychiatric:         Behavior: Behavior normal.             Significant Labs: All pertinent labs within the past 24 hours have been reviewed.    Significant Imaging: I have reviewed all pertinent imaging results/findings within the past 24 hours.    Assessment/Plan:      * Hypotension  -presents with hypotension likely due to hemorrhage versus sepsis  -fluid resuscitated in the ED with borderline response  -low threshold to initiate on pressors  -repeat lactic acid with significant improvement      Acute gastric ulcer with hemorrhage  - noted on CT abdomen/pelvis  - GI consulted for EGD today      Weight loss  -uncharacterized by patient but has been ongoing over several months   -presentation very concerning for underlying GI malignancy; will require additional workup    Emphysema lung  Patient's COPD is  controlled currently.  Patient is currently off COPD Pathway.  Suspect that tachypnea time of admission was secondary to lactic acidosis  -may benefit from outpatient PFTs and smoking cessation    Sepsis  This patient does have evidence of infective focus  My overall impression is septic shock due to lactate > 4.  Source: Abdominal  Antibiotics given-   Antibiotics (72h ago, onward)      Start     Stop Route Frequency Ordered    01/07/24 1900  piperacillin-tazobactam (ZOSYN) 4.5 g in dextrose 5 % in water (D5W) 100 mL IVPB (MB+)         -- IV Every 8 hours (non-standard times) 01/07/24 1205          Latest lactate reviewed-  Recent Labs   Lab 01/07/24  0937 01/07/24  1323 01/07/24  1815   LACTATE 9.9* 2.7* 2.1       Organ dysfunction indicated by Acute respiratory failure    Fluid challenge Actual Body weight- Patient will receive 30ml/kg actual body weight to calculate fluid bolus for treatment of septic shock.     Post- resuscitation assessment Yes Perfusion exam was performed within 6 hours of septic shock presentation after bolus shows Adequate tissue perfusion assessed by non-invasive monitoring       Will Not start Pressors- Levophed for MAP of 65  Source control achieved by:  IV antibiotics  -follow-up on blood cultures obtained in the ED    Lactic acidosis  -in the setting possible sepsis      Hyperglycemia  -noted on admission   -check A1c  -sliding scale insulin      Lung nodules  -noted on admission CTA; can follow-up as outpatient      Tobacco abuse  -reports at least 58 pack-year history  -nicotine patch  -can consider counseling cessation once more stable      Anemia  Patient's anemia is currently uncontrolled. Has received 1 units of PRBCs on 1/7 . Etiology likely d/t acute blood loss which was from hematemesis and possible superimposed upon chronic loss from suspected gastrointestinal malignancy  Current CBC reviewed-   Lab Results   Component Value Date    HGB 8.8 (L) 01/08/2024    HCT 26.2 (L)  01/08/2024     Monitor serial CBC and transfuse if patient becomes hemodynamically unstable, symptomatic or H/H drops below 7/21.  - iron studies noted  -GI consulted for EGD    Emphysematous gastritis  -noted incidentally on chest CTA  -initiated on broad-spectrum IV antibiotics for now  -dedicated CT abdomen with large lesser curvature gastric ulcer with erosion into and inflammation of adjacent pancreatic tail/panreatitis      Hematemesis with nausea  - presents with 1 episode mild hematemesis; given findings on CT, concern for malignancy vs PUD vs emphysematous gastritis  - Pathway initiated  - 2 LBIV  - initiated on protonix 40mg IV bid  - hold anticoagulation  - trend CBC tid for now; transfuse Hb <7  - NPO  - GI consulted, plan for EGD today        VTE Risk Mitigation (From admission, onward)           Ordered     IP VTE HIGH RISK PATIENT  Once         01/07/24 1204     Place sequential compression device  Until discontinued         01/07/24 1204                    Discharge Planning   DAVID:      Code Status: Full Code   Is the patient medically ready for discharge?:     Reason for patient still in hospital (select all that apply): Treatment and Consult recommendations               Critical care time spent on the evaluation and treatment of severe organ dysfunction, review of pertinent labs and imaging studies, discussions with consulting providers and discussions with patient/family: 30 minutes.      Davin Hinojosa MD  Department of Hospital Medicine   [facility] - Intensive Care

## 2024-01-08 NOTE — ASSESSMENT & PLAN NOTE
This patient does have evidence of infective focus  My overall impression is septic shock due to lactate > 4.  Source: Abdominal  Antibiotics given-   Antibiotics (72h ago, onward)    Start     Stop Route Frequency Ordered    01/07/24 1900  piperacillin-tazobactam (ZOSYN) 4.5 g in dextrose 5 % in water (D5W) 100 mL IVPB (MB+)         -- IV Every 8 hours (non-standard times) 01/07/24 1205        Latest lactate reviewed-  Recent Labs   Lab 01/07/24  0937 01/07/24  1323 01/07/24  1815   LACTATE 9.9* 2.7* 2.1       Organ dysfunction indicated by Acute respiratory failure    Fluid challenge Actual Body weight- Patient will receive 30ml/kg actual body weight to calculate fluid bolus for treatment of septic shock.     Post- resuscitation assessment Yes Perfusion exam was performed within 6 hours of septic shock presentation after bolus shows Adequate tissue perfusion assessed by non-invasive monitoring       Will Not start Pressors- Levophed for MAP of 65  Source control achieved by:  IV antibiotics  -follow-up on blood cultures obtained in the ED

## 2024-01-08 NOTE — HPI
73 year-old male was admitted on 1/7/24 for hypotension/hemetemesis. Imaging revealed a possible gastroesophageal/gastric mass. Consult is for likely upper GI malignancy.

## 2024-01-08 NOTE — NURSING
"Pt refused hip xray.  Pt states, "I dont have any pain in my hip so I dont want the xray."  Dr Goyal who ordered xray notified through secure chat.   "

## 2024-01-08 NOTE — PROGRESS NOTES
"[facility] Progress Note    Subjective:      Reports feeling pain to left knee and hip related to fall yesterday. Reports one bloody BM yesterday. Anticipating EGD this morning.     Objective:     Last 24 Hour Vital Signs:  BP  Min: 74/51  Max: 140/80  Temp  Av.9 °F (36.6 °C)  Min: 96.4 °F (35.8 °C)  Max: 98.7 °F (37.1 °C)  Pulse  Av.3  Min: 75  Max: 126  Resp  Av.9  Min: 16  Max: 90  SpO2  Av.3 %  Min: 90 %  Max: 100 %  Height  Av' 11.5" (181.6 cm)  Min: 5' 11" (180.3 cm)  Max: 6' (182.9 cm)  Weight  Av.4 kg (201 lb 8.7 oz)  Min: 89 kg (196 lb 3.4 oz)  Max: 95.3 kg (210 lb)  I/O last 3 completed shifts:  In: 1573.9 [I.V.:1022.9; Blood:306.3; IV Piggyback:244.8]  Out: 1427 [Urine:1426; Stool:1]    Physical Examination:  General appearance: alert, appears stated age, and cooperative  Head: Normocephalic, without obvious abnormality, atraumatic  Eyes: conjunctivae/corneas clear. PERRL, EOM's intact  Neck: no JVD and supple, symmetrical, trachea midline  Back: no kyphosis present, no scoliosis present  Lungs:  coarse breath sounds bilaterally  Chest wall: no tenderness, barrel chested  Heart: regular rate and rhythm  Abdomen: abnormal findings:  diffuse tenderness to palpation mainly in the epigastric regions  Extremities: extremities normal, atraumatic, no cyanosis or edema      Laboratory:  Trended Lab Data:  Recent Labs     24  0928 24  1323 24  1756 24  0013 24  0553   WBC 13.53*   < > 13.64* 18.10* 12.37   HGB 10.0*   < > 8.2* 8.9* 8.8*   HCT 30.6*   < > 24.9* 26.6* 26.2*      < > 257 244 251   *  --  132*  --   --    K 4.1  --  5.1  --   --      --  106  --   --    CO2 13*  --  19*  --   --    BUN 25*  --  36*  --   --    CREATININE 1.2  --  0.9  --   --    *  --  115*  --   --    BILITOT 0.3  --  0.2  --   --    AST 10  --  14  --   --    ALT 5*  --  10  --   --    ALKPHOS 59  --  49*  --   --  "   CALCIUM 8.6*  --  8.1*  --   --    ALBUMIN 2.3*  --  2.1*  --   --    PROT 4.9*  --  4.3*  --   --    MG 2.2  --   --   --   --     < > = values in this interval not displayed.       Cardiac:   Recent Labs   Lab 01/07/24 0928   TROPONINI 0.016   BNP 26       Urinalysis:   Lab Results   Component Value Date    COLORU Yellow 01/07/2024    SPECGRAV >1.030 (A) 01/07/2024    NITRITE Negative 01/07/2024    KETONESU Negative 01/07/2024    UROBILINOGEN Negative 01/07/2024       Microbiology:  Microbiology Results (last 7 days)       Procedure Component Value Units Date/Time    Blood Culture #2 **CANNOT BE ORDERED STAT** [[id number]] Collected: 01/07/24 0930    Order Status: Completed Specimen: Blood from Peripheral, Antecubital, Right Updated: 01/08/24 0115     Blood Culture, Routine No Growth to date    Blood Culture #1 **CANNOT BE ORDERED STAT** [[id number]] Collected: 01/07/24 0936    Order Status: Completed Specimen: Blood from Peripheral, Hand, Left Updated: 01/08/24 0115     Blood Culture, Routine No Growth to date            Radiology:  CT Abdomen: Findings suggestive of a >5 cm lesser curvature gastric ulcer with erosion into and inflammation of the adjacent pancreatic tail/pancreatitis.  Blood products are suspected within the gastric lumen. Aortic aneurysm dilation with eccentric mural thrombus.     I have personally reviewed the above labs and imaging.    Current Medications:     Infusions:       Scheduled:   albuterol-ipratropium  3 mL Nebulization Q6H WAKE    nicotine  1 patch Transdermal Daily    pantoprazole  40 mg Intravenous BID    piperacillin-tazobactam (Zosyn) IV (PEDS and ADULTS) (extended infusion is not appropriate)  4.5 g Intravenous Q8H    vancomycin (VANCOCIN) IV (PEDS and ADULTS)  2,500 mg Intravenous Q24H        PRN:  0.9%  NaCl infusion (for blood administration), 0.9%  NaCl infusion (for blood administration), sodium chloride 0.9%, dextrose 10%, dextrose 10%, glucagon (human recombinant),  insulin aspart U-100, Pharmacy to dose Vancomycin consult **AND** vancomycin - pharmacy to dose    Assessment:     Pino Nance is a 73 y.o.male with  Patient Active Problem List    Diagnosis Date Noted    Hematemesis with nausea 01/07/2024    Hypotension 01/07/2024    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 Also likely responsible for elevated D-dimer. BP stabilized after fluid administration in the ED. All other vitals stable.  - Last ABG 7.309/31.5/15.8; anion gap = 23 (0900 1/7/24)  - Lactic acid level down trending 9.9 -> 2.7->2.1  - WBC count elevated to 18.5 this afternoon.   - Blood cultures collected and started on vancomycin and zosyn.   - Vanc held, no concern for MRSA source     Plan:  -EGD today to attempt bleeding control and evaluate the need for surgical management  -Repeat Hgb. Typed and crossed, transfused 1u pRBC.   - Goal of transfusion in hemorrhagic shock is reversal of hypoperfusion, not merely Hgb >7.  -Recommend de-escalation of antibiotics as no infectious source for MRSA is suspected, can de-escalate to zosyn alone.      Respiratory:   Probable COPD  Tobacco Use Disorder  - Patient's history of daily brown phlegm production, physical exam, emphysematous changes on CXR, and tobacco history are suggestive of newly diagnosed COPD.  - O2 saturations within normal limits at this time. Breathing at baseline effort.     Plan  - DuoNenapoleon Q6  - PFTs, 6MWT on discharge   -  regarding smoking  cessation     GI/FEN:  F: Euvolemic at this time  E: at goal; K>4, Mg>2  N: NPO for EGD. Optimize nutrition as soon as possible post-procedure.     Gastric ulcer bleed  Possible pancreatitis  Possible GI malignancy  - Gastric ulcer bleed seen on imaging. Meets 2/3 criteria for pancreatitis, lipase negative but with epigastric pain. Recent weight loss also concerning for possible gastric or pancreatic malignancy     Folate/B12 deficiency  Folate/B12 supplementation daily     Plan:  - EGD today     ID:   Suspected emphysematous gastritis  Suspected sepsis  - Currently on vancomycin and zosyn, consider de-escalation  - Continue to follow blood cultures     Renal:   No acute concerns  - Cr. 1.2 ->0.9 after volume resuscitation  - Strict I/Os, daily weights     Heme/Onc:   Possible gastric vs pancreatic malignancy   Symptomatic Anemia  - WBC 18.45, Hgb 9.2  - CA 19-9 5.8  - Follow biopsy results from EGD    Endocrine:  Hyperglycemia  - A1c 5.9  - SSI + Accuchecks  - Goal glucose 140-180 while in ICU       Critical Care Daily Checklist:    A: Awake: RASS Goal/Actual Goal: 0  Actual: Dickson Agitation Sedation Scale (RASS): 0   B: Spontaneous Breathing Trial Performed? N/A   C: SAT & SBT Coordinated?  N/A                      D: Delirium: CAM-ICU Overall CAM-ICU: Negative   E: Early Mobility Performed? Yes   F: Feeding Goal: Regular diet  Status: NPO for EGD     AS: Analgesia/Sedation None currently   T: Thromboembolic Prophylaxis None given active bleeding   H: HOB > 300 Yes   U: Stress Ulcer Prophylaxis (if needed) None   G: Glucose Control None   B: Bowel Function Stool Occurrence: Yesterday; Regimen: None for now   I: Indwelling Catheter (Lines & Malave) Necessity None   D: De-escalation of Antimicrobials/Pharmacotherapies DC vancomycin today    Plan for the day/ETD EGD    Code Status:  Family/Goals of Care: Full  TBD     Roro Goyal MD  [facility] EM/IM  [facility]     Pt seen and examined  with Pulmonary/Critical Care team and this note was reviewed and validated with the following additional comments: Major UGIB.  CT suggestive of gastric mass.  This was confirmed by EGD,  Thus far has not rebled.  Surgery aware of pt.    Critical Care time was spent validating the history and physical exam, reviewing the lab and imaging results, and discussing the care of the patient with the bedside nurse and the patient and/or surrogates. This critical care time did not overlap with that of any other provider or involve time for any procedures.  This patient has a high probability of sudden clinically significant deterioration which requires the highest level of physician preparedness to intervene urgently. I managed/supervised life or organ supporting interventions that required frequent physician assessments. I devoted my full attention in the ICU to the direct care of this patient for this period of time. Organ systems which are failing and require intensive, critical care support are: GI, cardiovascular.  Critical Care time: 30 minutes    Aubrey Dias MD  Phone [phone]

## 2024-01-08 NOTE — PROGRESS NOTES
Patient transported to endo with anaesthesia and rajni RN, on monitor. All IV gtts stopped, will resume when patient returns from procedure.

## 2024-01-08 NOTE — ASSESSMENT & PLAN NOTE
"- admitted for hematemesis/hypotension  - imaging (1/7/24) revealed a "large lesser curvature gastric ulcer with erosion into and inflammation of the adjacent pancreatic tail/pancreatitis."  - upper GI endoscopy (1/8/24) revealed "extrinsic narrowing of the GE junction attributed to malignant compression from a gastric adenocarcinoma. Large (5cm x 3cm), penetrating ulcer of the gastric cardia and proximal body which is typical for adenocarcinoma although lymphoma is also possible - biopsies obtained lesser curvature gastric ulcer with erosion into and inflammation of the adjacent pancreatic tail/pancreatitis."  - I suspect he has gastric adenocarcinoma. Follow up biopsy results.  - I will arrange for follow-up appointment in clinic to review results of biopsy and discuss management.  "

## 2024-01-08 NOTE — PROGRESS NOTES
This is an attestation of a separate note written by the ICU resident today. As the teaching physician, I was present for and have confirmed the key portions of the service documented in the resident's note from today. In addition to the resident's note, I add the following:     Mr. Nance is a 72 yo M with tobacco use disorder (116 PY hx) who presented to [facility] on 1/7/24. Patient hasn't seen a doctor in 40 years, but states he has lost 50 pounds in the past year. He presented with abdominal pain, weakness, and hematemesis found to be hypotensive and hypoxic in the ED. Initial labs notable for hgb 10, lactate 9.9, AG 19, bicarb 13. CT abd/pel demonstrated lesser curvature gastric ulcer with erosion into the pancreatic tail and blood products within the gastric lumen. Patient received 3.8L IVF in the ED and was admitted to the MICU.    Plan:  Shock: Hemorrhagic. Received 3.8L IVF in the ED with improvement in BP to 140/80. Type and screened. Administering 1U PRBC at this time as systolic blood pressures are back in the 90's. If patient continues to be hypotensive or requires pressors, continue transfusing regardless of hgb. If requiring >3-4 units overnight, call GI and surgery.  Gastric ulcer with erosion: On PPI IV BID. CBC q6h. Hgb has dropped 10 --> 9.2 --> 8.2. 1U PRBC now. EGD tomorrow with GI. On Zosyn for emphysematous gastritis.  Metabolic acidosis: Lactate 9.9 --> 2.7. Monitoring.  Tobacco use disorder: Smokes 1 PPD currently. Nicotine patch ordered. Will need outpatient PFTs/6MWT.  Nutrition: NPO  DVT ppx: SCDs    Karyna Vasquez MD  [facility] Attending  [phone]

## 2024-01-08 NOTE — PROGRESS NOTES
Smoking cessation education note: Pt is a 1 pk/day cigarette smoker x 58 yrs. He denies nicotine withdrawal symptoms at this time. Will follow up for additional smoking cessation education when patient condition improves.

## 2024-01-08 NOTE — ASSESSMENT & PLAN NOTE
Patient's anemia is currently uncontrolled. Has received 1 units of PRBCs on 1/7 . Etiology likely d/t acute blood loss which was from hematemesis and possible superimposed upon chronic loss from suspected gastrointestinal malignancy  Current CBC reviewed-   Lab Results   Component Value Date    HGB 8.8 (L) 01/08/2024    HCT 26.2 (L) 01/08/2024     Monitor serial CBC and transfuse if patient becomes hemodynamically unstable, symptomatic or H/H drops below 7/21.  - iron studies noted  -GI consulted for EGD

## 2024-01-08 NOTE — PROGRESS NOTES
Pharmacist Intervention IV to PO Note    Pino Nance is a 73 y.o. male being treated with IV medication folic acid    Patient Data:    Vital Signs (Most Recent):  Temp: 98.6 °F (37 °C) (01/08/24 0758)  Pulse: 82 (01/08/24 0800)  Resp: (!) 24 (01/08/24 0800)  BP: 127/79 (01/08/24 0800)  SpO2: 96 % (01/08/24 0800) Vital Signs (72h Range):  Temp:  [96.4 °F (35.8 °C)-98.7 °F (37.1 °C)]   Pulse:  []   Resp:  [16-90]   BP: ()/(40-80)   SpO2:  [90 %-100 %]      CBC:  Recent Labs   Lab 01/07/24 1756 01/08/24  0013 01/08/24  0553   WBC 13.64* 18.10* 12.37   RBC 2.70* 2.97* 2.97*   HGB 8.2* 8.9* 8.8*   HCT 24.9* 26.6* 26.2*    244 251   MCV 92 90 88   MCH 30.4 30.0 29.6   MCHC 32.9 33.5 33.6     CMP:     Recent Labs   Lab 01/07/24  0928 01/07/24  1756 01/08/24  0553   * 115* 89   CALCIUM 8.6* 8.1* 8.4*   ALBUMIN 2.3* 2.1*  --    PROT 4.9* 4.3*  --    * 132* 134*   K 4.1 5.1 4.4   CO2 13* 19* 21*    106 107   BUN 25* 36* 38*   CREATININE 1.2 0.9 0.8   ALKPHOS 59 49*  --    ALT 5* 10  --    AST 10 14  --    BILITOT 0.3 0.2  --        Dietary Orders:  Diet Orders            Diet NPO Except for: Sips with Medication: NPO starting at 01/07 1503            Based on the following criteria, this patient qualifies for intravenous to oral conversion:  [x] The patients gastrointestinal tract is functioning (tolerating medications via oral or enteral route for 24 hours and tolerating food or enteral feeds for 24 hours).  [x] The patient is hemodynamically stable for 24 hours (heart rate <100 beats per minute, systolic blood pressure >99 mm Hg, and respiratory rate <20 breaths per minute).  [x] The patient shows clinical improvement (afebrile for at least 24 hours and white blood cell count downtrending or normalized). Additionally, the patient must be non-neutropenic (absolute neutrophil count >500 cells/mm3).  [x] For antimicrobials, the patient has received IV therapy for at least 24  hours.    IV medication folic acid will be changed to oral medication     Pharmacist's Name: Darinel Hogue  Pharmacist's Extension: 1570

## 2024-01-08 NOTE — CONSULTS
[facility] - Intensive Care  Hematology/Oncology  Consult Note    Patient Name: Pino Nance  MRN: [id number]  Admission Date: 1/7/2024  Hospital Length of Stay: 1 days  Code Status: Full Code   Attending Provider: Davin Hinojosa,*  Consulting Provider: Rl Weinberg MD  Primary Care Physician: No primary care provider on file.  Principal Problem:Hypotension    Inpatient consult to Hematology/Oncology  Consult performed by: Rl Weinberg MD  Consult ordered by: Davin Hinojosa MD  Reason for consult: gastric malignancy        Subjective:     HPI:  73 year-old male was admitted on 1/7/24 for hypotension/hemetemesis. Imaging revealed a possible gastroesophageal/gastric mass. Consult is for likely upper GI malignancy.    - he endorses fatigue, poor PO intake due to taste changes, unintentional weight loss, upper abdominal pain.      Oncology Treatment Plan:   [No matching plan found]    Medications:  Continuous Infusions:  Scheduled Meds:   albuterol-ipratropium  3 mL Nebulization Q6H WAKE    cyanocobalamin  1,000 mcg Oral Daily    [START ON 1/9/2024] folic acid  1 mg Oral Daily    LIDOcaine  1 patch Transdermal Q24H    mupirocin   Nasal BID    nicotine  1 patch Transdermal Daily    pantoprazole  40 mg Intravenous BID    piperacillin-tazobactam (Zosyn) IV (PEDS and ADULTS) (extended infusion is not appropriate)  4.5 g Intravenous Q8H     PRN Meds:0.9%  NaCl infusion (for blood administration), 0.9%  NaCl infusion (for blood administration), sodium chloride 0.9%, dextrose 10%, dextrose 10%, glucagon (human recombinant), HYDROcodone-acetaminophen, insulin aspart U-100     Review of patient's allergies indicates:  No Known Allergies     History reviewed. No pertinent past medical history.  History reviewed. No pertinent surgical history.  Family History    Family history is unknown by patient.       Tobacco Use    Smoking status: Every Day     Current packs/day: 1.00     Average packs/day: 1 pack/day  for 58.0 years (58.0 ttl pk-yrs)     Types: Cigarettes     Start date: 1966    Smokeless tobacco: Not on file   Substance and Sexual Activity    Alcohol use: Not Currently    Drug use: Never    Sexual activity: Not on file       Review of Systems   Constitutional:  Positive for fatigue and unexpected weight change.   HENT:  Negative for sore throat.    Eyes:  Negative for visual disturbance.   Respiratory:  Negative for shortness of breath.    Cardiovascular:  Negative for chest pain.   Gastrointestinal:  Positive for abdominal pain.   Genitourinary:  Negative for dysuria.   Musculoskeletal:  Negative for back pain.   Skin:  Negative for rash.   Neurological:  Positive for weakness. Negative for headaches.   Hematological:  Negative for adenopathy.   Psychiatric/Behavioral:  The patient is not nervous/anxious.      Objective:     Vital Signs (Most Recent):  Temp: 97.9 °F (36.6 °C) (01/08/24 1328)  Pulse: 74 (01/08/24 1328)  Resp: 18 (01/08/24 1328)  BP: 129/76 (01/08/24 1328)  SpO2: 98 % (01/08/24 1328) Vital Signs (24h Range):  Temp:  [97.9 °F (36.6 °C)-98.7 °F (37.1 °C)] 97.9 °F (36.6 °C)  Pulse:  [] 74  Resp:  [17-90] 18  SpO2:  [90 %-99 %] 98 %  BP: ()/(48-80) 129/76     Weight: 88.9 kg (196 lb)  Body mass index is 27.34 kg/m².  Body surface area is 2.11 meters squared.      Intake/Output Summary (Last 24 hours) at 1/8/2024 1522  Last data filed at 1/8/2024 1324  Gross per 24 hour   Intake 646.32 ml   Output 1677 ml   Net -1030.68 ml        Physical Exam  Vitals and nursing note reviewed.   Constitutional:       Appearance: He is well-developed.      Comments: fatigued   HENT:      Head: Normocephalic and atraumatic.   Eyes:      Pupils: Pupils are equal, round, and reactive to light.   Cardiovascular:      Rate and Rhythm: Normal rate and regular rhythm.   Pulmonary:      Effort: Pulmonary effort is normal.      Breath sounds: Normal breath sounds.   Abdominal:      General: Bowel sounds are normal.       Palpations: Abdomen is soft.   Musculoskeletal:         General: Normal range of motion.      Cervical back: Normal range of motion and neck supple.   Skin:     General: Skin is warm and dry.   Neurological:      Mental Status: He is alert and oriented to person, place, and time.   Psychiatric:         Behavior: Behavior normal.         Thought Content: Thought content normal.         Judgment: Judgment normal.          Significant Labs:   CBC:   Recent Labs   Lab 01/08/24  0013 01/08/24  0553 01/08/24  1211   WBC 18.10* 12.37 11.76   HGB 8.9* 8.8* 9.4*   HCT 26.6* 26.2* 29.1*    251 294    and CMP:   Recent Labs   Lab 01/07/24  0928 01/07/24  1756 01/08/24  0553   * 132* 134*   K 4.1 5.1 4.4    106 107   CO2 13* 19* 21*   * 115* 89   BUN 25* 36* 38*   CREATININE 1.2 0.9 0.8   CALCIUM 8.6* 8.1* 8.4*   PROT 4.9* 4.3*  --    ALBUMIN 2.3* 2.1*  --    BILITOT 0.3 0.2  --    ALKPHOS 59 49*  --    AST 10 14  --    ALT 5* 10  --    ANIONGAP 19* 7* 6*       Diagnostic Results:  Upper GI endoscopy (1/8/24):   - Extrinsic narrowing of the GE junction attributed to malignant compression from a gastric adenocarcinoma   - Large (5cm x 3cm), penetrating ulcer of the gastric cardia and proximal body which is typical for adenocarcinoma although lymphoma is also possible - biopsies obtained     CT abdomen/pelvis (1/7/24): I have personally reviewed the images  Findings suggestive of a large lesser curvature gastric ulcer with erosion into and inflammation of the adjacent pancreatic tail/pancreatitis.  Blood products are suspected within the gastric lumen.     Nonspecific liquid large bowel contents     Abdominal aortic aneurysm      CTA chest (1/7/24): I have personally reviewed the images  No evidence for aortic dissection or pulmonary embolus.     Abnormal appearance of the stomach with abnormal hyperdensity noted and questionable air within the stomach walls.  Component of emphysematous gastritis  "not excluded.  Additionally, there is stranding of the fat along the posterior in the inferior margin of the stomach that extends towards the pancreas for which pancreatitis cannot be excluded.  Further evaluation with a dedicated CT scan of the abdomen and pelvis with IV and oral contrast material is recommended.     At least 2 indeterminate hypodensities are seen in the left hepatic lobe.  This can be better evaluated with CT scan of the abdomen.        Assessment/Plan:     Malignant neoplasm of cardia of stomach  - admitted for hematemesis/hypotension  - imaging (1/7/24) revealed a "large lesser curvature gastric ulcer with erosion into and inflammation of the adjacent pancreatic tail/pancreatitis."  - upper GI endoscopy (1/8/24) revealed "extrinsic narrowing of the GE junction attributed to malignant compression from a gastric adenocarcinoma. Large (5cm x 3cm), penetrating ulcer of the gastric cardia and proximal body which is typical for adenocarcinoma although lymphoma is also possible - biopsies obtained lesser curvature gastric ulcer with erosion into and inflammation of the adjacent pancreatic tail/pancreatitis."  - I suspect he has gastric adenocarcinoma. Follow up biopsy results.  - I will arrange for follow-up appointment in clinic to review results of biopsy and discuss management.        Thank you for your consult.    Rl Weinberg MD  Hematology/Oncology  [facility] - Intensive Care  "

## 2024-01-08 NOTE — ASSESSMENT & PLAN NOTE
-presents with hypotension likely due to hemorrhage versus sepsis  -fluid resuscitated in the ED with borderline response  -low threshold to initiate on pressors  -repeat lactic acid with significant improvement

## 2024-01-09 PROBLEM — C16.9 MALIGNANT NEOPLASM OF STOMACH: Status: ACTIVE | Noted: 2024-01-08

## 2024-01-09 LAB
ANION GAP SERPL CALC-SCNC: 7 MMOL/L (ref 8–16)
BASOPHILS # BLD AUTO: 0.05 K/UL (ref 0–0.2)
BASOPHILS NFR BLD: 0.6 % (ref 0–1.9)
BUN SERPL-MCNC: 18 MG/DL (ref 8–23)
CALCIUM SERPL-MCNC: 8.5 MG/DL (ref 8.7–10.5)
CHLORIDE SERPL-SCNC: 106 MMOL/L (ref 95–110)
CO2 SERPL-SCNC: 22 MMOL/L (ref 23–29)
CREAT SERPL-MCNC: 0.8 MG/DL (ref 0.5–1.4)
DIFFERENTIAL METHOD BLD: ABNORMAL
EOSINOPHIL # BLD AUTO: 0.1 K/UL (ref 0–0.5)
EOSINOPHIL NFR BLD: 1.2 % (ref 0–8)
ERYTHROCYTE [DISTWIDTH] IN BLOOD BY AUTOMATED COUNT: 17.3 % (ref 11.5–14.5)
EST. GFR  (NO RACE VARIABLE): >60 ML/MIN/1.73 M^2
GLUCOSE SERPL-MCNC: 92 MG/DL (ref 70–110)
HCT VFR BLD AUTO: 24.2 % (ref 40–54)
HGB BLD-MCNC: 8.1 G/DL (ref 14–18)
IMM GRANULOCYTES # BLD AUTO: 0.04 K/UL (ref 0–0.04)
IMM GRANULOCYTES NFR BLD AUTO: 0.5 % (ref 0–0.5)
LYMPHOCYTES # BLD AUTO: 2.2 K/UL (ref 1–4.8)
LYMPHOCYTES NFR BLD: 25.4 % (ref 18–48)
MCH RBC QN AUTO: 29.8 PG (ref 27–31)
MCHC RBC AUTO-ENTMCNC: 33.5 G/DL (ref 32–36)
MCV RBC AUTO: 89 FL (ref 82–98)
MONOCYTES # BLD AUTO: 0.5 K/UL (ref 0.3–1)
MONOCYTES NFR BLD: 5.6 % (ref 4–15)
NEUTROPHILS # BLD AUTO: 5.7 K/UL (ref 1.8–7.7)
NEUTROPHILS NFR BLD: 66.7 % (ref 38–73)
NRBC BLD-RTO: 0 /100 WBC
PLATELET # BLD AUTO: 247 K/UL (ref 150–450)
PMV BLD AUTO: 9.5 FL (ref 9.2–12.9)
POTASSIUM SERPL-SCNC: 4.1 MMOL/L (ref 3.5–5.1)
PREALB SERPL-MCNC: 9 MG/DL (ref 20–43)
RBC # BLD AUTO: 2.72 M/UL (ref 4.6–6.2)
SODIUM SERPL-SCNC: 135 MMOL/L (ref 136–145)
WBC # BLD AUTO: 8.5 K/UL (ref 3.9–12.7)

## 2024-01-09 PROCEDURE — 21400001 HC TELEMETRY ROOM: Mod: HCNC

## 2024-01-09 PROCEDURE — 1158F ADVNC CARE PLAN TLK DOCD: CPT | Mod: HCNC,CPTII,, | Performed by: STUDENT IN AN ORGANIZED HEALTH CARE EDUCATION/TRAINING PROGRAM

## 2024-01-09 PROCEDURE — 84134 ASSAY OF PREALBUMIN: CPT | Mod: HCNC | Performed by: SURGERY

## 2024-01-09 PROCEDURE — 99223 1ST HOSP IP/OBS HIGH 75: CPT | Mod: HCNC,,, | Performed by: PSYCHIATRY & NEUROLOGY

## 2024-01-09 PROCEDURE — 36415 COLL VENOUS BLD VENIPUNCTURE: CPT | Mod: HCNC | Performed by: SURGERY

## 2024-01-09 PROCEDURE — 25000003 PHARM REV CODE 250: Mod: HCNC | Performed by: STUDENT IN AN ORGANIZED HEALTH CARE EDUCATION/TRAINING PROGRAM

## 2024-01-09 PROCEDURE — 25000242 PHARM REV CODE 250 ALT 637 W/ HCPCS: Mod: HCNC | Performed by: HOSPITALIST

## 2024-01-09 PROCEDURE — 99223 1ST HOSP IP/OBS HIGH 75: CPT | Mod: HCNC,,, | Performed by: STUDENT IN AN ORGANIZED HEALTH CARE EDUCATION/TRAINING PROGRAM

## 2024-01-09 PROCEDURE — 99498 ADVNCD CARE PLAN ADDL 30 MIN: CPT | Mod: HCNC,,, | Performed by: STUDENT IN AN ORGANIZED HEALTH CARE EDUCATION/TRAINING PROGRAM

## 2024-01-09 PROCEDURE — 63600175 PHARM REV CODE 636 W HCPCS: Mod: HCNC | Performed by: HOSPITALIST

## 2024-01-09 PROCEDURE — 1152F DOC ADVNCD DIS COMFORT 1ST: CPT | Mod: HCNC,CPTII,, | Performed by: STUDENT IN AN ORGANIZED HEALTH CARE EDUCATION/TRAINING PROGRAM

## 2024-01-09 PROCEDURE — 99497 ADVNCD CARE PLAN 30 MIN: CPT | Mod: HCNC,25,, | Performed by: STUDENT IN AN ORGANIZED HEALTH CARE EDUCATION/TRAINING PROGRAM

## 2024-01-09 PROCEDURE — 94640 AIRWAY INHALATION TREATMENT: CPT | Mod: HCNC

## 2024-01-09 PROCEDURE — 85025 COMPLETE CBC W/AUTO DIFF WBC: CPT | Mod: HCNC | Performed by: HOSPITALIST

## 2024-01-09 PROCEDURE — 80048 BASIC METABOLIC PNL TOTAL CA: CPT | Mod: HCNC | Performed by: HOSPITALIST

## 2024-01-09 PROCEDURE — 97116 GAIT TRAINING THERAPY: CPT | Mod: HCNC

## 2024-01-09 PROCEDURE — 27000221 HC OXYGEN, UP TO 24 HOURS: Mod: HCNC

## 2024-01-09 PROCEDURE — C9113 INJ PANTOPRAZOLE SODIUM, VIA: HCPCS | Mod: HCNC | Performed by: HOSPITALIST

## 2024-01-09 PROCEDURE — 99900035 HC TECH TIME PER 15 MIN (STAT): Mod: HCNC

## 2024-01-09 PROCEDURE — 90833 PSYTX W PT W E/M 30 MIN: CPT | Mod: HCNC,,, | Performed by: PSYCHIATRY & NEUROLOGY

## 2024-01-09 PROCEDURE — 97161 PT EVAL LOW COMPLEX 20 MIN: CPT | Mod: HCNC

## 2024-01-09 PROCEDURE — 99223 1ST HOSP IP/OBS HIGH 75: CPT | Mod: HCNC,,, | Performed by: SURGERY

## 2024-01-09 PROCEDURE — 94761 N-INVAS EAR/PLS OXIMETRY MLT: CPT | Mod: HCNC

## 2024-01-09 PROCEDURE — 25000003 PHARM REV CODE 250: Mod: HCNC | Performed by: HOSPITALIST

## 2024-01-09 RX ORDER — MIRTAZAPINE 7.5 MG/1
7.5 TABLET, FILM COATED ORAL NIGHTLY
Status: DISCONTINUED | OUTPATIENT
Start: 2024-01-09 | End: 2024-01-10 | Stop reason: HOSPADM

## 2024-01-09 RX ORDER — FLUOXETINE 10 MG/1
10 CAPSULE ORAL DAILY
Status: DISCONTINUED | OUTPATIENT
Start: 2024-01-10 | End: 2024-01-10 | Stop reason: HOSPADM

## 2024-01-09 RX ADMIN — PIPERACILLIN AND TAZOBACTAM 4.5 G: 4; .5 INJECTION, POWDER, LYOPHILIZED, FOR SOLUTION INTRAVENOUS; PARENTERAL at 11:01

## 2024-01-09 RX ADMIN — PIPERACILLIN AND TAZOBACTAM 4.5 G: 4; .5 INJECTION, POWDER, LYOPHILIZED, FOR SOLUTION INTRAVENOUS; PARENTERAL at 04:01

## 2024-01-09 RX ADMIN — FOLIC ACID 1 MG: 1 TABLET ORAL at 09:01

## 2024-01-09 RX ADMIN — PANTOPRAZOLE SODIUM 40 MG: 40 INJECTION, POWDER, LYOPHILIZED, FOR SOLUTION INTRAVENOUS at 09:01

## 2024-01-09 RX ADMIN — PANTOPRAZOLE SODIUM 40 MG: 40 INJECTION, POWDER, LYOPHILIZED, FOR SOLUTION INTRAVENOUS at 10:01

## 2024-01-09 RX ADMIN — HYDROCODONE BITARTRATE AND ACETAMINOPHEN 1 TABLET: 5; 325 TABLET ORAL at 12:01

## 2024-01-09 RX ADMIN — CYANOCOBALAMIN TAB 1000 MCG 1000 MCG: 1000 TAB at 09:01

## 2024-01-09 RX ADMIN — MUPIROCIN: 20 OINTMENT TOPICAL at 09:01

## 2024-01-09 RX ADMIN — MUPIROCIN: 20 OINTMENT TOPICAL at 10:01

## 2024-01-09 RX ADMIN — MIRTAZAPINE 7.5 MG: 7.5 TABLET ORAL at 10:01

## 2024-01-09 RX ADMIN — PIPERACILLIN AND TAZOBACTAM 4.5 G: 4; .5 INJECTION, POWDER, LYOPHILIZED, FOR SOLUTION INTRAVENOUS; PARENTERAL at 07:01

## 2024-01-09 RX ADMIN — IPRATROPIUM BROMIDE AND ALBUTEROL SULFATE 3 ML: 2.5; .5 SOLUTION RESPIRATORY (INHALATION) at 07:01

## 2024-01-09 NOTE — ASSESSMENT & PLAN NOTE
-presents with hypotension likely due to hemorrhage versus sepsis  -fluid resuscitated in the ED with borderline response  -low threshold to initiate on pressors  -repeat lactic acid with significant improvement    Hypotension resolved - 1/9

## 2024-01-09 NOTE — PROGRESS NOTES
[facility] Medicine  Progress Note    Patient Name: Pino Nance  MRN: [id number]  Patient Class: IP- Inpatient   Admission Date: 1/7/2024  Length of Stay: 2 days  Attending Physician: Andria Smith MD  Primary Care Provider: No primary care provider on file.        Subjective:     Principal Problem:Acute gastric ulcer with hemorrhage        HPI:  Mr. Nance is a 73-year-old man with tobacco use disorder who presents with episode of hematemesis and lightheadedness.  He states that he has not seen a doctor in almost 40 years and is on no home medications including NSAIDs.  States that over the past several months he has been having stomach pain associated with decreased appetite.  Also reports the food does not taste as good as normal and that he has had a bad taste in his mouth that stays there over this time.  States that he used to be a size 52 waist but that now his pants are no longer fitting.  He does not weigh himself so unsure how much weight he has lost.    Overview/Hospital Course:  No notes on file    Interval History:  feeling fatigued. Wants to go home but is unsure if he can ambulate. PT consult. Waiting on surgery consult this morning.     Review of Systems  Objective:     Vital Signs (Most Recent):  Temp: 97.7 °F (36.5 °C) (01/09/24 1200)  Pulse: 69 (01/09/24 1100)  Resp: 18 (01/09/24 1200)  BP: 130/75 (01/09/24 1100)  SpO2: 98 % (01/09/24 1300) Vital Signs (24h Range):  Temp:  [97.7 °F (36.5 °C)-98.4 °F (36.9 °C)] 97.7 °F (36.5 °C)  Pulse:  [63-90] 69  Resp:  [16-40] 18  SpO2:  [76 %-100 %] 98 %  BP: ()/(63-77) 130/75     Weight: 88.5 kg (195 lb 1.7 oz)  Body mass index is 27.21 kg/m².    Intake/Output Summary (Last 24 hours) at 1/9/2024 1430  Last data filed at 1/9/2024 0625  Gross per 24 hour   Intake 488.26 ml   Output 1100 ml   Net -611.74 ml           Physical Exam  Vitals reviewed.   Constitutional:       General: He is not in acute distress.  HENT:       Mouth/Throat:      Comments: Poor dentition  Cardiovascular:      Rate and Rhythm: Normal rate and regular rhythm.      Heart sounds: Normal heart sounds.   Pulmonary:      Effort: Pulmonary effort is normal. No respiratory distress.      Breath sounds: Normal breath sounds.   Abdominal:      General: There is no distension.      Palpations: Abdomen is soft.      Tenderness: There is abdominal tenderness (Epigastric).   Musculoskeletal:         General: No deformity.   Skin:     General: Skin is warm and dry.      Findings: No rash.   Neurological:      Mental Status: He is alert and oriented to person, place, and time.   Psychiatric:      Comments: Flat affect             Significant Labs: All pertinent labs within the past 24 hours have been reviewed.    Significant Imaging: I have reviewed all pertinent imaging results/findings within the past 24 hours.    Assessment/Plan:      * Acute gastric ulcer with hemorrhage  - noted on CT abdomen/pelvis  - GI consulted - s/p EGD which showed a large malignant ulcer concerning for adenocarcinoma, biopsied      General surgery & oncology consult     - recommend OP oncology consult   - surgery recommends - surgical oncology follow up at [facility], psych & palliative consult       Malignant neoplasm of stomach        Weight loss  -uncharacterized by patient but has been ongoing over several months   -presentation very concerning for underlying GI malignancy; will require additional workup    Emphysema lung  Patient's COPD is controlled currently.  Patient is currently off COPD Pathway.  Suspect that tachypnea time of admission was secondary to lactic acidosis  -may benefit from outpatient PFTs and smoking cessation    Sepsis  This patient does have evidence of infective focus  My overall impression is septic shock due to lactate > 4.  Source: Abdominal  Antibiotics given-   Antibiotics (72h ago, onward)      Start     Stop Route Frequency Ordered    01/07/24 1900   piperacillin-tazobactam (ZOSYN) 4.5 g in dextrose 5 % in water (D5W) 100 mL IVPB (MB+)         -- IV Every 8 hours (non-standard times) 01/07/24 1205          Latest lactate reviewed-  Recent Labs   Lab 01/07/24  0937 01/07/24  1323 01/07/24  1815   LACTATE 9.9* 2.7* 2.1       Organ dysfunction indicated by Acute respiratory failure    Fluid challenge Actual Body weight- Patient will receive 30ml/kg actual body weight to calculate fluid bolus for treatment of septic shock.     Post- resuscitation assessment Yes Perfusion exam was performed within 6 hours of septic shock presentation after bolus shows Adequate tissue perfusion assessed by non-invasive monitoring       Will Not start Pressors- Levophed for MAP of 65  Source control achieved by:  IV antibiotics  -follow-up on blood cultures obtained in the ED    Lactic acidosis  -in the setting possible sepsis      Hyperglycemia  -noted on admission   -A1c 5.9  -sliding scale insulin      Lung nodules  -noted on admission CTA; can follow-up as outpatient      Tobacco abuse  -reports at least 58 pack-year history  -nicotine patch  -can consider counseling cessation once more stable      Anemia  Patient's anemia is currently uncontrolled. Has received 1 units of PRBCs on 1/7 . Etiology likely d/t acute blood loss which was from hematemesis and possible superimposed upon chronic loss from suspected gastrointestinal malignancy  Current CBC reviewed-   Lab Results   Component Value Date    HGB 8.8 (L) 01/08/2024    HCT 26.2 (L) 01/08/2024     Monitor serial CBC and transfuse if patient becomes hemodynamically unstable, symptomatic or H/H drops below 7/21.  - iron studies noted  -GI consulted for EGD    Emphysematous gastritis  -noted incidentally on chest CTA  -initiated on broad-spectrum IV antibiotics for now  -dedicated CT abdomen with large lesser curvature gastric ulcer with erosion into and inflammation of adjacent pancreatic  tail/panreatitis      Hypotension  -presents with hypotension likely due to hemorrhage versus sepsis  -fluid resuscitated in the ED with borderline response  -low threshold to initiate on pressors  -repeat lactic acid with significant improvement    Hypotension resolved - 1/9      Hematemesis with nausea  - presents with 1 episode mild hematemesis; given findings on CT, concern for malignancy vs PUD vs emphysematous gastritis  - Pathway initiated  - 2 LBIV  - initiated on protonix 40mg IV bid  - hold anticoagulation  - as above        VTE Risk Mitigation (From admission, onward)           Ordered     IP VTE HIGH RISK PATIENT  Once         01/07/24 1204     Place sequential compression device  Until discontinued         01/07/24 1204                    Discharge Planning   DAVID: 1/9/2024     Code Status: Full Code   Is the patient medically ready for discharge?:     Reason for patient still in hospital (select all that apply): Patient trending condition and Consult recommendations  Discharge Plan A: Home Health        Discussed with ICU attending. Stable for step down to the floor. Transfer orders placed.       Andria Smith MD  Department of Hospital Medicine   [facility] - Intensive Care

## 2024-01-09 NOTE — CONSULTS
Consult Note  Palliative Care    Consult Requested By: Andria Smith MD  Reason for Consult: Goals of care    SUBJECTIVE:     History of Present Illness:  Disease Process: Cancer, Advanced Respiratory Disease    73M with reported PMH of 116 pk/yr tobacco use c/b untreated COPD otherwise unknown hx and no medical contact x 40 years who was admitted to the  service for syncope.    Chief Complaint Leading to Admission    Pt reported several months of early satiety, dysgeusia and anorexia leading to massive but unquantified weight loss. On 1/7 he developed new vomiting and lost consciousness after standing up in the bathroom, awoke on the ground with acute left knee pain and presented to [facility] ED for evaluation.    Arrived with sat 77% on RA and BP 74/40 with abdominal tenderness. Started on 15L NRB and bolused IVF 3.8L with BP corrected. CT abdomen/pelvis was markedly abnormal with a large and complex ulcerative gastric mass at the lesser curvature, abutting and possibly invading the pancreatic tail given nearby signs of inflammation.    Started on vancomycin to cover intra-abdominal infection. GI was consulted and recommended pt be kept NPO for diagnostic EGD. Admitted to ICU. Had melanotic stool overnight.    Interval Hospital Course    1/8: EGD confirmed a large ulcerated mass in the gastric cardia highly suspicious for malignancy. Biopsied. Family advised of suspected dx of gastric adenocarcinoma. Pt reports his spouse is demented and cannot make decisions on his behalf. Does not have a formal HCPOA established as yet.    1/9: Home health referral sent. Palliative has been consulted for goals of care.    At time of interview pt is up to chair in [facility], AAOx3, chronically ill appearing with pale and sallow skin. He reports he previously worked  in the [place] for a local [employer] company and up until one year ago enjoyed riding his motorcycle, hunting and fishing.    About one year ago pt's spouse's dementia  "began progressing and he was no longer able to leave the house on motorcycle trips. Their two daughters live in the home. The older (Carol) is there all the time; the younger (Yana) is working full time. He reports their house is already cramped and over-occupied and is unsure whether he can accept home health services or home hospice for this reason.    Pt is aware of working dx of gastric cancer awaiting formal path. Advised pt that conventional chemotherapy for this disease is generally burdensome and may well have risks > benefits particularly in light of pt's questionable social support. Immunotherapy would likely have a far better risk/benefit ratio if pt is a candidate however we are awaiting further information from pathology to determine eligibility. Surgical resection is not off the table based on the localized appearance on CT and would probably have the greatest benefit / risk ratio if attempted.    Patient was visibly depressed through the entire visit and repeatedly asked "what's the point" re: cancer-directed treatments. Attempted to establish rapport and identify any pleasurable activities or interests however pt does not engage much or volunteer information. He has used marijuana in the past but declines Marinol. He complains of left knee pain since the fall which is known secondary to a bone contusion but declines pain medication. He is not sure if he can ambulate unassisted today due to the pain.     He has now designated both of his daughters as HCPOA due to his spouse's incapacity.    ---    With pt's permission contacted his daughter Carol who confirms the above details and agrees that the house is very small and cannot accommodate any further equipment. She reports pt has appeared depressed throughout this entire year due to his wife's advancing dementia; she does not always recognize him anymore and he is appropriately grieving for her loss of personality. She states that pt has always " "been very tight-lipped about his health and did not mention any illness to them until the syncopal episode "forced his hand".     Advised Carol that despite the poor home environment pt would meaningfully benefit from home hospice care RN visits to the home as pt is historically med averse and may not request help otherwise. If pt wishes for cancer directed treatment he is free to withdraw from hospice at any time without consequences. Pt will not accept placement nor does he have financial backing and so options are very limited.    Carol reports that she, her sister, and pt's spouse will present tomorrow for a visit and the palliative team will further discuss the risks and benefits of home hospice with pt. Deferred code status discussion today as pt is tearful and despondent; revisit with family support.    History reviewed. No pertinent past medical history.  Past Surgical History:   Procedure Laterality Date    ESOPHAGOGASTRODUODENOSCOPY N/A 1/8/2024    Procedure: EGD (ESOPHAGOGASTRODUODENOSCOPY);  Surgeon: Alfredo Harper MD;  Location: [facility];  Service: Endoscopy;  Laterality: N/A;     Family History   Family history unknown: Yes     Social History     Tobacco Use    Smoking status: Every Day     Current packs/day: 1.00     Average packs/day: 1 pack/day for 58.0 years (58.0 ttl pk-yrs)     Types: Cigarettes     Start date: 1966   Substance Use Topics    Alcohol use: Not Currently    Drug use: Never     Mental Status: Oriented x3    ECOG Performance Status Grade: 3 - Confined to bed or chair 50% of waking hours    Review of Systems:  Positive for depression, anorexia, left knee pain.    Review of Symptoms      Symptom Assessment (ESAS 0-10 Scale)  Pain:  6  Dyspnea:  0  Anxiety:  0  Nausea:  0  Depression:  10  Anorexia:  7  Fatigue:  9  Insomnia:  0  Restlessness:  0  Agitation:  0     CAM / Delirium:  Negative  Constipation:  Negative  Diarrhea:  Negative      Pain Assessment:    Location(s): " leg    Leg       Location: left        Quality: Sharp        Quantity: 7/10 in intensity        Chronicity: Onset 6 day(s) ago, gradually improving        Aggravating Factors: Activity        Alleviating Factors: None        Associated Symptoms: None    ECOG Performance Status thGthrthathdtheth:th th4th Living Arrangements:  Lives in home and Lives with family    Psychosocial/Cultural:   See Palliative Psychosocial Note: No  Social Issues Identified: Coping deficit pt/family, New Diagnosis/Trauma, Mental Health, Capacity/Surrogate Questions, and Financial Issues  Bereavement Risk: Yes: Close or dependent relationship to the  person, Social isolation or loss of a support system or friendships, and Identified: work/home, financial, intimacy, and caregiver concerns   Caregiver Needs Discussed. Caregiver Distress: Yes: Need for respite, Issues of guilt, Intensity of family caregiving, Caregiver Burnout Risk, and Caregiver support and community resources discussed.    Cultural: No specific concerns  **Primary  to Follow**  Palliative Care  Consult: No    Spiritual:  F - Leanna and Belief:  Nonobservant  I - Importance:  N/a  C - Community:  N/a  A - Address in Care:   support PRN     Time-Based Charting:  Yes  Chart Review: 23 minutes  Face to Face: 18 minutes  Symptom Assessment: 16 minutes  Coordination of Care: 12 minutes  Discharge Plannin minutes  Advance Care Plannin minutes  Goals of Care: 35 minutes    Total Time Spent: 127 minutes      Advance Care Planning   Advance Directives:   Living Will: No        Oral Declaration: No    [place]POST: No    Do Not Resuscitate Status: No    Medical Power of : Yes    Agent's Name:  Kevin Nance (daughter)   Agent's Contact Number:  [phone]    Decision Making:  Patient answered questions and Family answered questions  Goals of Care: The patient and family endorses that what is most important right now is to focus on spending time at  home, avoiding the hospital, remaining as independent as possible, symptom/pain control, and improvement in condition but with limits to invasive therapies    Accordingly, we have decided that the best plan to meet the patient's goals includes continuing with treatment         OBJECTIVE:     Physical Exam  Constitutional:       General: He is not in acute distress.     Appearance: He is obese. He is ill-appearing. He is not toxic-appearing or diaphoretic.   HENT:      Head: Normocephalic and atraumatic.      Mouth/Throat:      Mouth: Mucous membranes are moist.      Pharynx: Oropharynx is clear.   Eyes:      Extraocular Movements: Extraocular movements intact.      Pupils: Pupils are equal, round, and reactive to light.   Cardiovascular:      Rate and Rhythm: Normal rate and regular rhythm.      Pulses: Normal pulses.      Heart sounds: Normal heart sounds. No murmur heard.  Pulmonary:      Effort: Pulmonary effort is normal.      Breath sounds: Normal breath sounds. No wheezing or rales.   Abdominal:      General: Abdomen is flat. There is no distension.      Palpations: Abdomen is soft.      Tenderness: There is abdominal tenderness. There is no guarding.   Musculoskeletal:         General: Tenderness (medial joint lines of L knee) and signs of injury (trace erythema and small effusion at R knee) present. No swelling or deformity. Normal range of motion.   Skin:     General: Skin is warm and dry.      Capillary Refill: Capillary refill takes less than 2 seconds.      Coloration: Skin is pale and sallow. Skin is not jaundiced.   Neurological:      General: No focal deficit present.      Mental Status: He is alert and oriented to person, place, and time.      GCS: GCS eye subscore is 4. GCS verbal subscore is 5. GCS motor subscore is 6.      Cranial Nerves: No cranial nerve deficit.      Sensory: No sensory deficit.      Motor: No weakness.      Coordination: Coordination normal.      Gait: Gait normal.      Deep  Tendon Reflexes: Reflexes normal.   Psychiatric:         Attention and Perception: Attention and perception normal.         Mood and Affect: Mood is depressed. Affect is flat.         Speech: Speech normal.         Behavior: Behavior normal.         Thought Content: Thought content normal.         Cognition and Memory: Cognition and memory normal.         Judgment: Judgment normal.       ASSESSMENT/PLAN:     73M with PMH of extensive tobacco use (116 pk/yr) c/b COPD admitted to  service for syncope 2/2 chronic GI blood loss anemia with workup positive for a large ulcerating gastric mass highly worrisome for locally advanced primary gastric adenocarcinoma. Biopsy to confirm working dx is in process. Palliative consulted for goals of care.    Pt is presenting late due to major depression which is reactive to both his spouse's advancing dementia and his own acutely failing health. He is overall a poor candidate for any cancer directed treatment given poor home environment and financial stress. Agree with surgery assessment that resection is not out of the question but pt would have to accept TPN which he is likely to refuse.    Pt is visibly depressed and apathetic and while I do not question his intellectual capacity to decline cancer directed treatment I am concerned that he is overwhelmed and not fully weighing his options. I have requested his family present to bedside for an in person discussion of surgical and/or medical cancer treatment vs transitioning to comfort focused care under home hospice.     Recommendations:  Medical: supportive care  Symptom Management:   # Reactive depression  - owing to spouse's severe dementia and new probable cancer dx  - start mirtazapine 7.5mg qhs for mood, appetite, sleep  Psychosocial: active major depression  Legal: has now designated two adult daughters as durable HCPOA  Prognosis: < 6 months for locally advanced gastric cancer and qualified for hospice at any  point    Jose Atkins MD  Hospice and Palliative Medicine  Palliative Care Pager: [phone]    Advance Care Planning     Date: 01/09/2024    Power of   I initiated the process of voluntary advance care planning today and explained the importance of this process to the patient.  I introduced the concept of advance directives to the patient, as well. Then the patient received detailed information about the importance of designating a Health Care Power of  (HCPOA). He was also instructed to communicate with this person about their wishes for future healthcare, should he become sick and lose decision-making capacity. The patient has not previously appointed a HCPOA. After our discussion, the patient has decided to complete a HCPOA and has appointed his daughter, health care agent: Carol Nance & health care agent number: [phone]  I spent a total time of 49 minutes discussing this issue with the patient.         [facility]  I engaged the patient and family in a voluntary conversation about advance care planning and we specifically addressed what the goals of care would be moving forward, in light of the patient's change in clinical status, specifically gastric cancer.  We did specifically address the patient's likely prognosis, which is poor.  We explored the patient's values and preferences for future care.  The patient and family endorses that what is most important right now is to focus on spending time at home, avoiding the hospital, remaining as independent as possible, symptom/pain control, and quality of life, even if it means sacrificing a little time    Accordingly, we have decided that the best plan to meet the patient's goals includes continuing with treatment    I did explain the role for hospice care at this stage of the patient's illness, including its ability to help the patient live with the best quality of life possible.  We will not be making a hospice referral at this time but  will revisit at upcoming family meeting.    I spent a total of 49 minutes engaging the patient in this advance care planning discussion.

## 2024-01-09 NOTE — ASSESSMENT & PLAN NOTE
- noted on CT abdomen/pelvis  - GI consulted - s/p EGD which showed a large malignant ulcer concerning for adenocarcinoma, biopsied      General surgery & oncology consult     - recommend OP oncology consult   - surgery recommends - surgical oncology follow up at [facility], psych & palliative consult

## 2024-01-09 NOTE — PLAN OF CARE
Problem: Adjustment to Illness (Gastrointestinal Bleeding)  Goal: Optimal Coping with Acute Illness  Outcome: Ongoing, Progressing     Problem: Bleeding (Gastrointestinal Bleeding)  Goal: Hemostasis  Outcome: Ongoing, Progressing     Problem: Adult Inpatient Plan of Care  Goal: Plan of Care Review  Outcome: Ongoing, Progressing  Goal: Patient-Specific Goal (Individualized)  Outcome: Ongoing, Progressing  Goal: Absence of Hospital-Acquired Illness or Injury  Outcome: Ongoing, Progressing  Goal: Optimal Comfort and Wellbeing  Outcome: Ongoing, Progressing     Problem: Adjustment to Illness (Sepsis/Septic Shock)  Goal: Optimal Coping  Outcome: Ongoing, Progressing     Problem: Bleeding (Sepsis/Septic Shock)  Goal: Absence of Bleeding  Outcome: Ongoing, Progressing     Problem: Glycemic Control Impaired (Sepsis/Septic Shock)  Goal: Blood Glucose Level Within Desired Range  Outcome: Ongoing, Progressing     Problem: Infection Progression (Sepsis/Septic Shock)  Goal: Absence of Infection Signs and Symptoms  Outcome: Ongoing, Progressing     Problem: Nutrition Impaired (Sepsis/Septic Shock)  Goal: Optimal Nutrition Intake  Outcome: Ongoing, Progressing

## 2024-01-09 NOTE — PROVATION PATIENT INSTRUCTIONS
Discharge Summary/Instructions after an Endoscopic Procedure  Patient Name: Pino Hanson  Patient MRN: [id number]  Patient YOB: 1950 Monday, January 8, 2024  Alfredo Harper MD  Dear patient,  As a result of recent federal legislation (The Federal Cures Act), you may   receive lab or pathology results from your procedure in your [facility]   account before your physician is able to contact you. Your physician or   their representative will relay the results to you with their   recommendations at their soonest availability.  Thank you,  Your health is very important to us during the Covid Crisis. Following your   procedure today, you will receive a daily text for 2 weeks asking about   signs or symptoms of Covid 19.  Please respond to this text when you   receive it so we can follow up and keep you as safe as possible.   RESTRICTIONS:  During your procedure today, you received medications for sedation.  These   medications may affect your judgment, balance and coordination.  Therefore,   for 24 hours, you have the following restrictions:   - DO NOT drive a car, operate machinery, make legal/financial decisions,   sign important papers or drink alcohol.    ACTIVITY:  Today: no heavy lifting, straining or running due to procedural   sedation/anesthesia.  The following day: return to full activity including work.  DIET:  Eat and drink normally unless instructed otherwise.     TREATMENT FOR COMMON SIDE EFFECTS:  - Mild abdominal pain, nausea, belching, bloating or excessive gas:  rest,   eat lightly and use a heating pad.  - Sore Throat: treat with throat lozenges and/or gargle with warm salt   water.  - Because air was used during the procedure, expelling large amounts of air   from your rectum or belching is normal.  - If a bowel prep was taken, you may not have a bowel movement for 1-3 days.    This is normal.  SYMPTOMS TO WATCH FOR AND REPORT TO YOUR PHYSICIAN:  1. Abdominal pain or bloating, other than  gas cramps.  2. Chest pain.  3. Back pain.  4. Signs of infection such as: chills or fever occurring within 24 hours   after the procedure.  5. Rectal bleeding, which would show as bright red, maroon, or black stools.   (A tablespoon of blood from the rectum is not serious, especially if   hemorrhoids are present.)  6. Vomiting.  7. Weakness or dizziness.  GO DIRECTLY TO THE NEAREST EMERGENCY ROOM IF YOU HAVE ANY OF THE FOLLOWING:      Difficulty breathing              Chills and/or fever over 101 F   Persistent vomiting and/or vomiting blood   Severe abdominal pain   Severe chest pain   Black, tarry stools   Bleeding- more than one tablespoon   Any other symptom or condition that you feel may need urgent attention  Your doctor recommends these additional instructions:  If any biopsies were taken, your doctors clinic will contact you in 1 to 2   weeks with any results.  Consult with oncology and surgery to plan additional management  For questions, problems or results please call your physician - Alfredo Harper MD.  EMERGENCY PHONE NUMBER: [phone],  LAB RESULTS: [phone]  IF A COMPLICATION OR EMERGENCY SITUATION ARISES AND YOU ARE UNABLE TO REACH   YOUR PHYSICIAN - GO DIRECTLY TO THE EMERGENCY ROOM.  MD Alfredo Howard MD  1/8/2024 1:52:59 PM  This report has been verified and signed electronically.  Dear patient,  As a result of recent federal legislation (The Federal Cures Act), you may   receive lab or pathology results from your procedure in your [facility]   account before your physician is able to contact you. Your physician or   their representative will relay the results to you with their   recommendations at their soonest availability.  Thank you,  PROVATION

## 2024-01-09 NOTE — PLAN OF CARE
The sw sent the pt's info to [facility] via [employer] to see if they can accommodate the pt.        01/09/24 1004   Post-Acute Status   Post-Acute Authorization Home Health   Home Health Status Referrals Sent   Discharge Plan   Discharge Plan A Home Health   Discharge Plan B Other  (TBD)

## 2024-01-09 NOTE — PROGRESS NOTES
The sw met with the pt to further discuss his d/c plan. The pt admitted to the sw he stated he doesn't want to continue living. The sw  offered emotional support and active listening to the pt. He doesn't want dme b/c he states he doesn't have room for it at his house. He states he has a rw at home already from a past foot surgery. The sw notified the pt he may require hh but he states he doesn't want it. The sw encouraged the pt to reconsider hh to assist him with conditioning. The pt again stated he doesn't want it. The pt had a very flat affect. The sw informed the pt she will arrange transportation for him at d/c because he states he's unsure who will come to pick him up. The pt thanked the sw for coming to check on him and offering him a ride home at d/c.

## 2024-01-09 NOTE — PLAN OF CARE
Problem: Adjustment to Illness (Gastrointestinal Bleeding)  Goal: Optimal Coping with Acute Illness  Outcome: Ongoing, Progressing     Problem: Bleeding (Gastrointestinal Bleeding)  Goal: Hemostasis  Outcome: Ongoing, Progressing     Problem: Adult Inpatient Plan of Care  Goal: Plan of Care Review  Outcome: Ongoing, Progressing  Goal: Patient-Specific Goal (Individualized)  Outcome: Ongoing, Progressing     Problem: Adjustment to Illness (Sepsis/Septic Shock)  Goal: Optimal Coping  Outcome: Ongoing, Progressing     Problem: Bleeding (Sepsis/Septic Shock)  Goal: Absence of Bleeding  Outcome: Ongoing, Progressing     Problem: Glycemic Control Impaired (Sepsis/Septic Shock)  Goal: Blood Glucose Level Within Desired Range  Outcome: Ongoing, Progressing     Problem: Infection Progression (Sepsis/Septic Shock)  Goal: Absence of Infection Signs and Symptoms  Outcome: Ongoing, Progressing

## 2024-01-09 NOTE — SUBJECTIVE & OBJECTIVE
Interval History:  feeling fatigued. Wants to go home but is unsure if he can ambulate. PT consult. Waiting on surgery consult this morning.     Review of Systems  Objective:     Vital Signs (Most Recent):  Temp: 97.7 °F (36.5 °C) (01/09/24 1200)  Pulse: 69 (01/09/24 1100)  Resp: 18 (01/09/24 1200)  BP: 130/75 (01/09/24 1100)  SpO2: 98 % (01/09/24 1300) Vital Signs (24h Range):  Temp:  [97.7 °F (36.5 °C)-98.4 °F (36.9 °C)] 97.7 °F (36.5 °C)  Pulse:  [63-90] 69  Resp:  [16-40] 18  SpO2:  [76 %-100 %] 98 %  BP: ()/(63-77) 130/75     Weight: 88.5 kg (195 lb 1.7 oz)  Body mass index is 27.21 kg/m².    Intake/Output Summary (Last 24 hours) at 1/9/2024 1430  Last data filed at 1/9/2024 0625  Gross per 24 hour   Intake 488.26 ml   Output 1100 ml   Net -611.74 ml           Physical Exam  Vitals reviewed.   Constitutional:       General: He is not in acute distress.  HENT:      Mouth/Throat:      Comments: Poor dentition  Cardiovascular:      Rate and Rhythm: Normal rate and regular rhythm.      Heart sounds: Normal heart sounds.   Pulmonary:      Effort: Pulmonary effort is normal. No respiratory distress.      Breath sounds: Normal breath sounds.   Abdominal:      General: There is no distension.      Palpations: Abdomen is soft.      Tenderness: There is abdominal tenderness (Epigastric).   Musculoskeletal:         General: No deformity.   Skin:     General: Skin is warm and dry.      Findings: No rash.   Neurological:      Mental Status: He is alert and oriented to person, place, and time.   Psychiatric:      Comments: Flat affect             Significant Labs: All pertinent labs within the past 24 hours have been reviewed.    Significant Imaging: I have reviewed all pertinent imaging results/findings within the past 24 hours.

## 2024-01-09 NOTE — PT/OT/SLP EVAL
Physical Therapy Evaluation and Treatment    Patient Name:  Pino Nance   MRN:  [id number]    Recommendations:     Discharge Recommendations: Low Intensity Therapy   Discharge Equipment Recommendations:  (rec use of RW (pt owns))   Barriers to discharge: None    Assessment:     Pino Nance is a 73 y.o. male admitted with a medical diagnosis of Acute gastric ulcer with hemorrhage.  He presents with the following impairments/functional limitations: gait instability, impaired balance, decreased lower extremity function, pain, decreased ROM.    PT evaluation performed. Pt was previously Independent with no AD. Pt at this time requires CGA/SBA with use of RW. Therapy recommending low intensity therapy. Therapy will continue to progress pt as able.    Rehab Prognosis: Good; patient would benefit from acute skilled PT services to address these deficits and reach maximum level of function.    Recent Surgery: Procedure(s) (LRB):  EGD (ESOPHAGOGASTRODUODENOSCOPY) (N/A) 1 Day Post-Op    Plan:     During this hospitalization, patient to be seen 3 x/week to address the identified rehab impairments via gait training, therapeutic activities, therapeutic groups, neuromuscular re-education and progress toward the following goals:    Plan of Care Expires:  02/09/24    Subjective     Chief Complaint: pain with seated flexion/extension of R knee  Patient/Family Comments/goals: to return home to [place]  Pain/Comfort:  Pain Rating 1: 0/10  Pain Addressed 1: Reposition, Cessation of Activity, Nurse notified  Pain Rating Post-Intervention 1:  (not rated)    Patients cultural, spiritual, Gnosticism conflicts given the current situation: no    Living Environment:  Lives with spouse (who has dementia) and 3 adult children in 1 story home with no steps to enter.  Prior to admission, patients level of function was Independent with no AD.  Equipment used at home: none (owns RW, rollator, SPC and shower chair).  Upon discharge, patient  will have assistance from: 3 adult children.    Objective:     Communicated with Nurse prior to session.  Patient found up in chair with telemetry, peripheral IV, pulse ox (continuous)  upon PT entry to room.    General Precautions: Standard, fall  Orthopedic Precautions:N/A   Braces: N/A  Respiratory Status: Room air    Exams:  Cognitive Exam:  Patient is oriented to Person, Place, Time, and Situation  Sensation:    -       Intact  light/touch to BLE  RLE ROM: WFL except at knee due to pain  RLE Strength: WFL except limited at knee due to pain  LLE ROM: WFL  LLE Strength: WFL    Functional Mobility:  Bed Mobility:     Sit to Supine: contact guard assistance  Transfers:     Sit to Stand:  stand by assistance with rolling walker  Bed to Chair: contact guard assistance with  rolling walker  using  Step Transfer  Gait: ~25ft with use of RW and CGA; around bedside in room.       AM-PAC 6 CLICK MOBILITY  Total Score:19       Treatment & Education:  Pt educated on role of therapy.   Pt with overall flat affect during session.   Pt educated on use of RW at this time due to R knee pain to ensure stability with mobility; pt states he has RW at home he can use.   Pt was up in chair prior to session but request to return to bed at end of session.     Patient left HOB elevated with all lines intact, call button in reach, and Nurse notified.    GOALS:   Multidisciplinary Problems       Physical Therapy Goals          Problem: Physical Therapy    Goal Priority Disciplines Outcome Goal Variances Interventions   Physical Therapy Goal     PT, PT/OT Ongoing, Progressing     Description: Goals to be met by: 24     Patient will increase functional independence with mobility by performin. Supine to sit with Modified [place]  2. Sit to supine with Modified [place]  3. Sit to stand transfer with Modified [place] with use of RW  4. Bed to chair transfer with Modified [place] using Rolling Walker  5. Gait  x  150 feet with Modified [place] using Rolling Walker.                          History:     History reviewed. No pertinent past medical history.    Past Surgical History:   Procedure Laterality Date    ESOPHAGOGASTRODUODENOSCOPY N/A 1/8/2024    Procedure: EGD (ESOPHAGOGASTRODUODENOSCOPY);  Surgeon: Alfredo Harper MD;  Location: [facility];  Service: Endoscopy;  Laterality: N/A;       Time Tracking:     PT Received On: 01/09/24  PT Start Time: 1446     PT Stop Time: 1508  PT Total Time (min): 22 min     Billable Minutes: Evaluation 10 and Gait Training 12      01/09/2024

## 2024-01-09 NOTE — ASSESSMENT & PLAN NOTE
- presents with 1 episode mild hematemesis; given findings on CT, concern for malignancy vs PUD vs emphysematous gastritis  - Pathway initiated  - 2 LBIV  - initiated on protonix 40mg IV bid  - hold anticoagulation  - as above

## 2024-01-09 NOTE — ANESTHESIA POSTPROCEDURE EVALUATION
Anesthesia Post Evaluation    Patient: Pino Nance    Procedure(s) Performed: Procedure(s) (LRB):  EGD (ESOPHAGOGASTRODUODENOSCOPY) (N/A)    Final Anesthesia Type: general      Patient location during evaluation: GI PACU  Patient participation: Yes- Able to Participate  Level of consciousness: awake and alert  Post-procedure vital signs: reviewed and stable  Pain management: adequate  Airway patency: patent    PONV status at discharge: No PONV  Anesthetic complications: no      Cardiovascular status: blood pressure returned to baseline  Respiratory status: unassisted  Hydration status: euvolemic  Follow-up not needed.            Vitals Value Taken Time   /74 01/08/24 1811   Temp  01/08/24 1816   Pulse 79 01/08/24 1816   Resp 20 01/08/24 1816   SpO2 99 % 01/08/24 1816   Vitals shown include unvalidated device data.      No case tracking events are documented in the log.      Pain/Michael Score: No data recorded

## 2024-01-09 NOTE — PLAN OF CARE
Had one stool moderate liquid black at 2230 last night. No other stools tonight, CT of the Left knee was done but not resulted yet. Labs noted. VS wnl.

## 2024-01-09 NOTE — PLAN OF CARE
The coby faxed the pt's info to various  agencies via [employer]. [facility] states via [employer] they can't admit the pt until next week.        01/09/24 1544   Post-Acute Status   Post-Acute Authorization Home Health   Home Health Status Referrals Sent   Discharge Plan   Discharge Plan A Home Health   Discharge Plan B Other  (TBD)

## 2024-01-09 NOTE — CONSULTS
PSYCHIATRY INPATIENT CONSULT NOTE      1/9/2024 2:29 PM   Pino Nance   1950   [id number]           DATE OF ADMISSION: 1/7/2024  9:13 AM    SITE: [facility]    CURRENT LEGAL STATUS: Patient does not currently meet PEC criteria due to not currently being an imminent threat to self/others and not being gravely disabled 2/2 mental illness at this time.     HISTORY    Per Initial History from Primary Team:  Patient presents with    Hypotension       Pt arrived via EMS from home, with complains of generalized weakness/ and x 1 emesis, EMS reports pt has not seen a MD in 40 years, and has no medical hx, sats via EMS= 82% on non re breather, with b/p - 74/40 in the field    Mr. Nance is a 73-year-old man with tobacco use disorder who presents with episode of hematemesis and lightheadedness.  He states that he has not seen a doctor in almost 40 years and is on no home medications including NSAIDs.  States that over the past several months he has been having stomach pain associated with decreased appetite.  Also reports the food does not taste as good as normal and that he has had a bad taste in his mouth that stays there over this time.  States that he used to be a size 52 waist but that now his pants are no longer fitting.  He does not weigh himself so unsure how much weight he has lost.  Interval History from Primary Team on 01/09/2024:  feeling fatigued. Wants to go home but is unsure if he can ambulate. PT consult. Waiting on surgery consult this morning.    Per Surgery Team on 01/09/2024:  Pino Nance is a 73 y.o. male with recent admission for fatigue, lightheadedness, hematemesis, and significant weight loss.  Pt has not seen doctor in 40 years.  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  PreAlbumin is 9.  Pt states food just does not taste good.  He states he is not sure he wants to keep living.  I have reviewed the patient's chart including prior progress notes,  procedures and testing.   ASSESSMENT & PLAN:  73 y.o. 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  Recommend Psych and Palliative care consult given his statement about not wanting to continue living.  He if does desire treatment he would need to see Surgical Oncology at main [facility] due to the proximal position of the tumor and likely require PICC/TPN for malnutrition.  Will sign off, please call if needs to be seen again.      Chief Complaint / Reason for Psychiatry Consult: high clinical suspicion for malignancy, per surgery attending- pt stated 'no desire to live'       HPI:   Pino Nance is a 73 y.o. male with a past medical history as noted above/below and no known or endorsed past psychiatric history, currently being treated by his inpatient primary team for a principle problem of acute gastric ulcer with hemorrhage.  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  Psychiatry was originally consulted as noted above.  The patient was seen and examined.  The chart was reviewed.  On examination today, the patient was alert and oriented to person, place, city, state, month, year, and situation.  He was CAM-ICU negative for delirium.  He endorses struggling with depression symptoms for the past 6-8 months in the context of stressors surrounding his wife's worsening dementia as well as his worsening physical health (see detailed psych ROS below for current / recent symptoms consistent with MDD).  He voices that he would be OK with dying but denies any desire or plan to volitionally harm himself.  Palliative Care Team has seen the patient and would like a family meeting with the patient and his two adult daughters to discuss goals of care, especially in relation to the work-up / treatment of this newly discovered gastric tumor (I am in agreement).  With reasonable medical certainty,  based on a present state examination, the patient currently appears to have medical decision-making capacity as made evident by his ability to cognitively utilize information provided to him to express a choice that is stable over time, to understand the relevant information pertaining his diagnoses and recommended treatments, to appreciate the consequences of the decision (risks vs benefits) regarding accepting vs refusing treatment, and to manipulate all of the data in a logical fashion.  The patient denies any current or prior s/s consistent with psychosis, chayo, BECKY, panic, OCD, PTSD, eating disorders, or substance abuse.  He endorses sleeping about 7-8 hours per night.  He endorses a poor appetite.  He denies any AH, VH, TH, delusions, paranoia, or DIGFAST (chayo) s/s.  He denies any active SI or HI.  He denies any adverse effects to his current medication regimen.  Regarding current medical/physical complaints, he endorses fatigue, appetite loss, and B knee pain.  He denies any other medical complaints at this time.  NAD was observed during the examination.  Psychotherapy was implemented as noted below with a focus on improving depression and safe / mature coping given stressors / adjustment.  See detailed psych ROS below.  See A/P below.        Psychiatric Review Of Systems - Currently, the patient is endorsing and/or denying the following:  (patient's endorsements are BOLDED below; if not BOLDED, then patient denied):    Endorses Symptoms of Depression / MDD: diminished mood, low motivation, loss of interest/anhedonia, irritability, diminished energy, change in sleep, change in appetite, diminished concentration or cognition or indecisiveness, PMR, intermittent feelings of guilt or hopelessness or worthlessness, suicidal ideations (patient states that he would be OK with dying but denies any desire or plan to volitionally harm himself)     Denies issues with Sleep: initiation, maintenance, early morning  awakening with inability to return to sleep    Denies Suicidal/Homicidal ideations: active/passive ideations, organized plans, future intentions    Denies Symptoms of psychosis: hallucinations, delusions, disorganized thinking, disorganized behavior or abnormal motor behavior, or negative symptoms (diminshed emotional expression, avolition, anhedonia, alogia, asociality     Denies Symptoms of chayo or hypomania: elevated, expansive, or irritable mood with increased energy or activity; with inflated self-esteem or grandiosity, decreased need for sleep, increased rate of speech, FOI or racing thoughts, distractibility, increased goal directed activity or PMA, risky/disinhibited behavior    Denies Symptoms of Anxiety: excessive anxiety/worry/fear, more days than not, about numerous issues, difficult to control, with restlessness, fatigue, poor concentration, irritability, muscle tension, sleep disturbance; causes functionally impairing distress     Denies Symptoms of Panic Disorder: recurrent panic attacks, precipitated or un-precipitated, source of worry and/or behavioral changes secondary; with or without agoraphobia    Denies Symptoms of PTSD: h/o trauma; re-experiencing/intrusive symptoms, avoidant behavior, negative alterations in cognition or mood, or hyperarousal symptoms; with or without dissociative symptoms     Denies Symptoms of OCD: obsessions or compulsions     Denies Symptoms of Eating Disorders: anorexia, bulimia or binging    Denies Substance Use: intoxication, withdrawal, tolerance, used in larger amounts or duration than intended, unsuccessful attempts to limit or quit, increased time engaging in or seeking out, cravings or strong desire to use, failure to fulfill obligations, negative consequences in social/interpersonal/occupational,/recreational areas, use in dangerous situations, medical or psychological consequences       [facility] Toolkit ASQ Suicide Screening Tool:  In the past few weeks, have you  wished you were dead? patient states that he would be OK with dying but denies any desire or plan to volitionally harm himself  In the past few weeks, have you felt that you or your family would be better off if you were dead? patient states that he would be OK with dying but denies any desire or plan to volitionally harm himself  In the past week, have you been having thoughts about killing yourself? Denies  Have you ever tried to kill yourself? Denies  Are you having thoughts of killing yourself right now? Denies       PSYCHOTHERAPY ADD-ON +[id number]   30 (16-37*) minutes    Time: 21 minutes  Participants: Met with patient    Therapeutic Intervention Type: behavior modifying psychotherapy, supportive psychotherapy  Why chosen therapy is appropriate versus another modality: relevant to diagnosis, patient responds to this modality, evidence based practice    Target symptoms: depression and grief / adjustment   Primary focus: improving depression and safe / mature coping given stressors / adjustment   Psychotherapeutic techniques: supportive and psychodynamic techniques; psycho-education; safe coping methods; CBT; problem solving techniques and managing life & medical stressors    Outcome monitoring methods: self-report, observation    Patient's response to intervention:  The patient's response to intervention is accepting.    Progress toward goals:  The patient's progress toward goals is fair.      ROS:  General ROS: negative for - chills, fever or night sweats; positive for fatigue  Ophthalmic ROS: negative for - blurry vision, double vision or eye pain  ENT ROS: negative for - sinus pain, headaches, sore throat or visual changes  Allergy and Immunology ROS: negative for - hives, itchy/watery eyes or nasal congestion  Hematological and Lymphatic ROS: negative for - bleeding problems, bruising, jaundice or pallor  Endocrine ROS: negative for - galactorrhea, hot flashes, mood swings, palpitations or temperature  "intolerance  Respiratory ROS: negative for - cough, hemoptysis, shortness of breath, tachypnea or wheezing  Cardiovascular ROS: negative for - chest pain, dyspnea on exertion, loss of consciousness, palpitations, rapid heart rate or shortness of breath  Gastrointestinal ROS: negative for - nausea, abdominal pain, blood in stools, change in bowel habits, constipation or diarrhea; positive for appetite loss  Genito-Urinary ROS: negative for - incontinence, nocturia or pelvic pain  Musculoskeletal ROS: negative for - joint stiffness, joint swelling, or muscle pain; positive for knee pain   Neurological ROS: negative for - behavioral changes, confusion, dizziness, memory loss, numbness/tingling or seizures  Dermatological ROS: negative for dry skin, hair changes, pruritus or rash  Psychiatric ROS: see detailed psychiatric ROS above in history section       Past Psychiatric History:  Previous Diagnoses: denies   Previous Medication Trials: denies  Previous Psychiatric Hospitalizations: denies   Previous Suicide Attempts: denies   History of Violence: denies  Outpatient Psychiatrist: denies     Social History:  Marital Status:   Children: 3 adult children (2 daughters and 1 son)   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   Education: high school diploma/GED  Special Ed:  denies  : 6 years in [employer]   Zoroastrianism: Amish   Housing Status: lives with wife and 3 adult children in a home in [place], [place]  Hobbies/Leisure time: "not too much these days"  History of phys/sexual abuse: denies  Access to gun: denies     Family Psychiatric History: denies     Substance Abuse History:  Recreational Drugs: denies  Use of Alcohol: denies  Rehab History: denies    Tobacco Use: 2 PPD x > 50 years (counseled on cessation)   Use of Caffeine: denies  Use of OTC: denies  Legal consequences of chemical use: denies     Legal History:  Past Charges/Incarcerations: denies  Pending charges: denies  "     Psychosocial Stressors: his wife's dementia, his health issues, and his reduced physical ability to do his old hobbies   Functioning Relationships: good support system in his adult children   Strengths AND Liabilities: Strength: Patient accepts guidance/feedback, Strength: Patient is expressive/articulate., Strength: Patient has positive support network., Liability: Patient has poor health., Liability: Patient lacks coping skills.      PAST MEDICAL & SURGICAL HISTORY   History reviewed. No pertinent past medical history.  Past Surgical History:   Procedure Laterality Date    ESOPHAGOGASTRODUODENOSCOPY N/A 1/8/2024    Procedure: EGD (ESOPHAGOGASTRODUODENOSCOPY);  Surgeon: Alfredo Harper MD;  [facility];  Service: Endoscopy;  Laterality: N/A;       NEUROLOGIC HISTORY  Seizures: denies   Head trauma with LOC: denies  CVA: denies      FAMILY HISTORY   Family History   Family history unknown: Yes       ALLERGIES   Review of patient's allergies indicates:  No Known Allergies    CURRENT MEDICATION REGIMEN   Home Meds:   Prior to Admission medications    Medication Sig Start Date End Date Taking? Authorizing Provider   ibuprofen (ADVIL,MOTRIN) 200 MG tablet Take 200 mg by mouth every 6 (six) hours as needed for Pain.   Yes Provider, Historical       OTC Meds: denies     Scheduled Meds:    cyanocobalamin  1,000 mcg Oral Daily    folic acid  1 mg Oral Daily    LIDOcaine  1 patch Transdermal Q24H    mirtazapine  7.5 mg Oral QHS    mupirocin   Nasal BID    nicotine  1 patch Transdermal Daily    pantoprazole  40 mg Intravenous BID    piperacillin-tazobactam (Zosyn) IV (PEDS and ADULTS) (extended infusion is not appropriate)  4.5 g Intravenous Q8H      PRN Meds: 0.9%  NaCl infusion (for blood administration), 0.9%  NaCl infusion (for blood administration), sodium chloride 0.9%, dextrose 10%, dextrose 10%, HYDROcodone-acetaminophen   Psychotherapeutics (From admission, onward)      Start     Stop Route Frequency  Ordered    01/09/24 2100  mirtazapine tablet 7.5 mg         -- Oral Nightly 01/09/24 4218            LABORATORY DATA   Recent Results (from the past 72 hour(s))   CBC auto differential    Collection Time: 01/07/24  9:28 AM   Result Value Ref Range    WBC 13.53 (H) 3.90 - 12.70 K/uL    RBC 3.29 (L) 4.60 - 6.20 M/uL    Hemoglobin 10.0 (L) 14.0 - 18.0 g/dL    Hematocrit 30.6 (L) 40.0 - 54.0 %    MCV 93 82 - 98 fL    MCH 30.4 27.0 - 31.0 pg    MCHC 32.7 32.0 - 36.0 g/dL    RDW 15.9 (H) 11.5 - 14.5 %    Platelets 343 150 - 450 K/uL    MPV 9.3 9.2 - 12.9 fL    Immature Granulocytes 2.7 (H) 0.0 - 0.5 %    Gran # (ANC) 8.8 (H) 1.8 - 7.7 K/uL    Immature Grans (Abs) 0.36 (H) 0.00 - 0.04 K/uL    Lymph # 3.4 1.0 - 4.8 K/uL    Mono # 0.9 0.3 - 1.0 K/uL    Eos # 0.1 0.0 - 0.5 K/uL    Baso # 0.04 0.00 - 0.20 K/uL    nRBC 0 0 /100 WBC    Gran % 64.9 38.0 - 73.0 %    Lymph % 25.0 18.0 - 48.0 %    Mono % 6.4 4.0 - 15.0 %    Eosinophil % 0.7 0.0 - 8.0 %    Basophil % 0.3 0.0 - 1.9 %    Differential Method Automated    Comprehensive metabolic panel    Collection Time: 01/07/24  9:28 AM   Result Value Ref Range    Sodium 135 (L) 136 - 145 mmol/L    Potassium 4.1 3.5 - 5.1 mmol/L    Chloride 103 95 - 110 mmol/L    CO2 13 (L) 23 - 29 mmol/L    Glucose 286 (H) 70 - 110 mg/dL    BUN 25 (H) 8 - 23 mg/dL    Creatinine 1.2 0.5 - 1.4 mg/dL    Calcium 8.6 (L) 8.7 - 10.5 mg/dL    Total Protein 4.9 (L) 6.0 - 8.4 g/dL    Albumin 2.3 (L) 3.5 - 5.2 g/dL    Total Bilirubin 0.3 0.1 - 1.0 mg/dL    Alkaline Phosphatase 59 55 - 135 U/L    AST 10 10 - 40 U/L    ALT 5 (L) 10 - 44 U/L    eGFR >60 >60 mL/min/1.73 m^2    Anion Gap 19 (H) 8 - 16 mmol/L   CPK    Collection Time: 01/07/24  9:28 AM   Result Value Ref Range    CPK 23 20 - 200 U/L   Troponin I    Collection Time: 01/07/24  9:28 AM   Result Value Ref Range    Troponin I 0.016 0.000 - 0.026 ng/mL   Brain natriuretic peptide    Collection Time: 01/07/24  9:28 AM   Result Value Ref Range    BNP 26 0 -  99 pg/mL   TSH    Collection Time: 01/07/24  9:28 AM   Result Value Ref Range    TSH 2.372 0.400 - 4.000 uIU/mL   Magnesium    Collection Time: 01/07/24  9:28 AM   Result Value Ref Range    Magnesium 2.2 1.6 - 2.6 mg/dL   D dimer, quantitative    Collection Time: 01/07/24  9:28 AM   Result Value Ref Range    D-Dimer 3.51 (H) <0.50 mg/L FEU   Ethanol    Collection Time: 01/07/24  9:28 AM   Result Value Ref Range    Alcohol, Serum <10 <10 mg/dL   Blood Culture #2 **CANNOT BE ORDERED STAT**    Collection Time: 01/07/24  9:30 AM    Specimen: Peripheral, Antecubital, Right; Blood   Result Value Ref Range    Blood Culture, Routine No Growth to date     Blood Culture, Routine No Growth to date    Blood Culture #1 **CANNOT BE ORDERED STAT**    Collection Time: 01/07/24  9:36 AM    Specimen: Peripheral, Hand, Left; Blood   Result Value Ref Range    Blood Culture, Routine No Growth to date     Blood Culture, Routine No Growth to date    Lactic acid, plasma    Collection Time: 01/07/24  9:37 AM   Result Value Ref Range    Lactate (Lactic Acid) 9.9 (HH) 0.5 - 2.2 mmol/L   POCT COVID-19 Rapid Screening    Collection Time: 01/07/24  9:53 AM   Result Value Ref Range    POC Rapid COVID Negative Negative     Acceptable Yes    POCT Influenza A/B Molecular    Collection Time: 01/07/24  9:53 AM   Result Value Ref Range    POC Molecular Influenza A Ag Negative Negative, Not Reported    POC Molecular Influenza B Ag Negative Negative, Not Reported     Acceptable Yes    Lipase    Collection Time: 01/07/24  9:54 AM   Result Value Ref Range    Lipase 38 4 - 60 U/L   Procalcitonin    Collection Time: 01/07/24  9:54 AM   Result Value Ref Range    Procalcitonin 0.07 <0.25 ng/mL   POCT ARTERIAL BLOOD GAS    Collection Time: 01/07/24  9:54 AM   Result Value Ref Range    POC PH 7.309 (L) 7.350 - 7.450    POC PCO2 31.5 (L) 35.0 - 45.0 mmHg    POC PO2 292 (H) 80.0 - 100 mmHg    POC SATURATED O2 100.0 95.0 - 100.0 %     POC HCO3 15.8 (L) 24.0 - 28.0 mmol/l    Base Deficit -9.5 (L) -2.0 - 2.0 mmol/l    Specimen source Arterial     Performed By: dominique     Allens Test NA     FiO2 100.0 %    LPM 15.0    Lactic acid, plasma #2    Collection Time: 01/07/24  1:23 PM   Result Value Ref Range    Lactate (Lactic Acid) 2.7 (H) 0.5 - 2.2 mmol/L   CBC auto differential    Collection Time: 01/07/24  1:23 PM   Result Value Ref Range    WBC 18.45 (H) 3.90 - 12.70 K/uL    RBC 3.04 (L) 4.60 - 6.20 M/uL    Hemoglobin 9.2 (L) 14.0 - 18.0 g/dL    Hematocrit 27.7 (L) 40.0 - 54.0 %    MCV 91 82 - 98 fL    MCH 30.3 27.0 - 31.0 pg    MCHC 33.2 32.0 - 36.0 g/dL    RDW 15.9 (H) 11.5 - 14.5 %    Platelets 261 150 - 450 K/uL    MPV 9.2 9.2 - 12.9 fL    Immature Granulocytes 0.9 (H) 0.0 - 0.5 %    Gran # (ANC) 16.2 (H) 1.8 - 7.7 K/uL    Immature Grans (Abs) 0.16 (H) 0.00 - 0.04 K/uL    Lymph # 1.2 1.0 - 4.8 K/uL    Mono # 0.9 0.3 - 1.0 K/uL    Eos # 0.0 0.0 - 0.5 K/uL    Baso # 0.04 0.00 - 0.20 K/uL    nRBC 0 0 /100 WBC    Gran % 87.8 (H) 38.0 - 73.0 %    Lymph % 6.4 (L) 18.0 - 48.0 %    Mono % 4.6 4.0 - 15.0 %    Eosinophil % 0.1 0.0 - 8.0 %    Basophil % 0.2 0.0 - 1.9 %    Differential Method Automated    Type & Screen    Collection Time: 01/07/24  1:23 PM   Result Value Ref Range    Group & Rh A POS     Indirect [place] NEG     Specimen Outdate 01/10/2024 23:59    Prepare RBC 2 Units; ongoing GI bleed    Collection Time: 01/07/24  1:23 PM   Result Value Ref Range    UNIT NUMBER [id number]     Product Code [id number]     DISPENSE STATUS TRANSFUSED     CODING SYSTEM LRYX313     Unit Blood Type Code 6200     Unit Blood Type A POS     Unit Expiration [id number]     CROSSMATCH INTERPRETATION Compatible     UNIT NUMBER [id number]     Product Code R6851M04     DISPENSE STATUS CROSSMATCHED     CODING SYSTEM [id number]     Unit Blood Type Code 6200     Unit Blood Type A POS     Unit Expiration [id number]     CROSSMATCH INTERPRETATION Compatible    Prepare RBC  2 Units; active GIB    Collection Time: 01/07/24  1:23 PM   Result Value Ref Range    UNIT NUMBER [id number]     Product Code E1055K13     DISPENSE STATUS CROSSMATCHED     CODING SYSTEM [id number]     Unit Blood Type Code 6200     Unit Blood Type A POS     Unit Expiration [id number]     CROSSMATCH INTERPRETATION Compatible     UNIT NUMBER [id number]     Product Code P4554W52     DISPENSE STATUS CROSSMATCHED     CODING SYSTEM [id number]     Unit Blood Type Code 6200     Unit Blood Type A POS     Unit Expiration [id number]     CROSSMATCH INTERPRETATION Compatible    POCT glucose    Collection Time: 01/07/24  5:09 PM   Result Value Ref Range    POCT Glucose 117 (H) 70 - 110 mg/dL   Iron and TIBC    Collection Time: 01/07/24  5:56 PM   Result Value Ref Range    Iron 70 45 - 160 ug/dL    Transferrin 151 (L) 200 - 375 mg/dL    TIBC 223 (L) 250 - 450 ug/dL    Saturated Iron 31 20 - 50 %   Ferritin    Collection Time: 01/07/24  5:56 PM   Result Value Ref Range    Ferritin 121 20.0 - 300.0 ng/mL   Folate    Collection Time: 01/07/24  5:56 PM   Result Value Ref Range    Folate 2.2 (L) 4.0 - 24.0 ng/mL   Vitamin B12    Collection Time: 01/07/24  5:56 PM   Result Value Ref Range    Vitamin B-12 229 210 - 950 pg/mL   CBC auto differential    Collection Time: 01/07/24  5:56 PM   Result Value Ref Range    WBC 13.64 (H) 3.90 - 12.70 K/uL    RBC 2.70 (L) 4.60 - 6.20 M/uL    Hemoglobin 8.2 (L) 14.0 - 18.0 g/dL    Hematocrit 24.9 (L) 40.0 - 54.0 %    MCV 92 82 - 98 fL    MCH 30.4 27.0 - 31.0 pg    MCHC 32.9 32.0 - 36.0 g/dL    RDW 15.9 (H) 11.5 - 14.5 %    Platelets 257 150 - 450 K/uL    MPV 9.4 9.2 - 12.9 fL    Immature Granulocytes 0.7 (H) 0.0 - 0.5 %    Gran # (ANC) 10.7 (H) 1.8 - 7.7 K/uL    Immature Grans (Abs) 0.10 (H) 0.00 - 0.04 K/uL    Lymph # 2.1 1.0 - 4.8 K/uL    Mono # 0.7 0.3 - 1.0 K/uL    Eos # 0.0 0.0 - 0.5 K/uL    Baso # 0.03 0.00 - 0.20 K/uL    nRBC 0 0 /100 WBC    Gran % 78.8 (H) 38.0 - 73.0 %    Lymph % 15.1 (L)  18.0 - 48.0 %    Mono % 5.1 4.0 - 15.0 %    Eosinophil % 0.1 0.0 - 8.0 %    Basophil % 0.2 0.0 - 1.9 %    Differential Method Automated    Comprehensive metabolic panel    Collection Time: 01/07/24  5:56 PM   Result Value Ref Range    Sodium 132 (L) 136 - 145 mmol/L    Potassium 5.1 3.5 - 5.1 mmol/L    Chloride 106 95 - 110 mmol/L    CO2 19 (L) 23 - 29 mmol/L    Glucose 115 (H) 70 - 110 mg/dL    BUN 36 (H) 8 - 23 mg/dL    Creatinine 0.9 0.5 - 1.4 mg/dL    Calcium 8.1 (L) 8.7 - 10.5 mg/dL    Total Protein 4.3 (L) 6.0 - 8.4 g/dL    Albumin 2.1 (L) 3.5 - 5.2 g/dL    Total Bilirubin 0.2 0.1 - 1.0 mg/dL    Alkaline Phosphatase 49 (L) 55 - 135 U/L    AST 14 10 - 40 U/L    ALT 10 10 - 44 U/L    eGFR >60 >60 mL/min/1.73 m^2    Anion Gap 7 (L) 8 - 16 mmol/L   Cancer antigen 19-9    Collection Time: 01/07/24  5:56 PM   Result Value Ref Range    CA 19-9 5.8 0.0 - 40.0 U/mL   Lactic acid, plasma    Collection Time: 01/07/24  6:15 PM   Result Value Ref Range    Lactate (Lactic Acid) 2.1 0.5 - 2.2 mmol/L   Drug screen panel, emergency    Collection Time: 01/07/24  6:53 PM   Result Value Ref Range    Benzodiazepines Negative Negative    Methadone metabolites Negative Negative    Cocaine (Metab.) Negative Negative    Opiate Scrn, Ur Negative Negative    Barbiturate Screen, Ur Negative Negative    Amphetamine Screen, Ur Negative Negative    THC Negative Negative    Phencyclidine Negative Negative    Creatinine, Urine 62.8 23.0 - 375.0 mg/dL    Toxicology Information SEE COMMENT    Urinalysis, Reflex to Urine Culture Urine, Clean Catch    Collection Time: 01/07/24  6:53 PM    Specimen: Urine   Result Value Ref Range    Specimen UA Urine, Clean Catch     Color, UA Yellow Yellow, Straw, Shavon    Appearance, UA Clear Clear    pH, UA 6.0 5.0 - 8.0    Specific Gravity, UA >1.030 (A) 1.005 - 1.030    Protein, UA Trace (A) Negative    Glucose, UA Negative Negative    Ketones, UA Negative Negative    Bilirubin (UA) Negative Negative     Occult Blood UA Negative Negative    Nitrite, UA Negative Negative    Urobilinogen, UA Negative <2.0 EU/dL    Leukocytes, UA Negative Negative   POCT glucose    Collection Time: 01/07/24 11:08 PM   Result Value Ref Range    POCT Glucose 100 70 - 110 mg/dL   CBC auto differential    Collection Time: 01/08/24 12:13 AM   Result Value Ref Range    WBC 18.10 (H) 3.90 - 12.70 K/uL    RBC 2.97 (L) 4.60 - 6.20 M/uL    Hemoglobin 8.9 (L) 14.0 - 18.0 g/dL    Hematocrit 26.6 (L) 40.0 - 54.0 %    MCV 90 82 - 98 fL    MCH 30.0 27.0 - 31.0 pg    MCHC 33.5 32.0 - 36.0 g/dL    RDW 16.6 (H) 11.5 - 14.5 %    Platelets 244 150 - 450 K/uL    MPV 9.3 9.2 - 12.9 fL    Immature Granulocytes 0.8 (H) 0.0 - 0.5 %    Gran # (ANC) 14.7 (H) 1.8 - 7.7 K/uL    Immature Grans (Abs) 0.15 (H) 0.00 - 0.04 K/uL    Lymph # 2.2 1.0 - 4.8 K/uL    Mono # 0.9 0.3 - 1.0 K/uL    Eos # 0.0 0.0 - 0.5 K/uL    Baso # 0.06 0.00 - 0.20 K/uL    nRBC 0 0 /100 WBC    Gran % 81.3 (H) 38.0 - 73.0 %    Lymph % 12.3 (L) 18.0 - 48.0 %    Mono % 5.1 4.0 - 15.0 %    Eosinophil % 0.2 0.0 - 8.0 %    Basophil % 0.3 0.0 - 1.9 %    Differential Method Automated    Hemoglobin A1c    Collection Time: 01/08/24  5:53 AM   Result Value Ref Range    Hemoglobin A1C 5.9 (H) 4.0 - 5.6 %    Estimated Avg Glucose 123 68 - 131 mg/dL   CBC auto differential    Collection Time: 01/08/24  5:53 AM   Result Value Ref Range    WBC 12.37 3.90 - 12.70 K/uL    RBC 2.97 (L) 4.60 - 6.20 M/uL    Hemoglobin 8.8 (L) 14.0 - 18.0 g/dL    Hematocrit 26.2 (L) 40.0 - 54.0 %    MCV 88 82 - 98 fL    MCH 29.6 27.0 - 31.0 pg    MCHC 33.6 32.0 - 36.0 g/dL    RDW 17.0 (H) 11.5 - 14.5 %    Platelets 251 150 - 450 K/uL    MPV 9.5 9.2 - 12.9 fL    Immature Granulocytes 0.6 (H) 0.0 - 0.5 %    Gran # (ANC) 9.0 (H) 1.8 - 7.7 K/uL    Immature Grans (Abs) 0.07 (H) 0.00 - 0.04 K/uL    Lymph # 2.5 1.0 - 4.8 K/uL    Mono # 0.7 0.3 - 1.0 K/uL    Eos # 0.0 0.0 - 0.5 K/uL    Baso # 0.05 0.00 - 0.20 K/uL    nRBC 0 0 /100 WBC     "Gran % 72.7 38.0 - 73.0 %    Lymph % 20.2 18.0 - 48.0 %    Mono % 5.8 4.0 - 15.0 %    Eosinophil % 0.3 0.0 - 8.0 %    Basophil % 0.4 0.0 - 1.9 %    Differential Method Automated    Basic Metabolic Panel    Collection Time: 01/08/24  5:53 AM   Result Value Ref Range    Sodium 134 (L) 136 - 145 mmol/L    Potassium 4.4 3.5 - 5.1 mmol/L    Chloride 107 95 - 110 mmol/L    CO2 21 (L) 23 - 29 mmol/L    Glucose 89 70 - 110 mg/dL    BUN 38 (H) 8 - 23 mg/dL    Creatinine 0.8 0.5 - 1.4 mg/dL    Calcium 8.4 (L) 8.7 - 10.5 mg/dL    Anion Gap 6 (L) 8 - 16 mmol/L    eGFR >60 >60 mL/min/1.73 m^2   POCT glucose    Collection Time: 01/08/24  6:56 AM   Result Value Ref Range    POCT Glucose 86 70 - 110 mg/dL   Echo    Collection Time: 01/08/24 11:07 AM   Result Value Ref Range    BSA 2.11 m2    LVOT stroke volume 121.38 cm3    LVIDd 5.37 3.5 - 6.0 cm    LV Systolic Volume 68.61 mL    LV Systolic Volume Index 32.8 mL/m2    LVIDs 3.97 2.1 - 4.0 cm    LV Diastolic Volume 139.68 mL    LV Diastolic Volume Index 66.83 mL/m2    IVS 1.24 (A) 0.6 - 1.1 cm    LVOT diameter 2.72 cm    LVOT area 5.8 cm2    FS 26 (A) 28 - 44 %    Left Ventricle Relative Wall Thickness 0.48 cm    Posterior Wall 1.28 (A) 0.6 - 1.1 cm    LV mass 280.41 g    LV Mass Index 134 g/m2    MV Peak E Kehinde 0.58 m/s    TDI LATERAL 0.10 m/s    TDI SEPTAL 0.05 m/s    E/E' ratio 7.73 m/s    MV Peak A Kehinde 0.80 m/s    TR Max Kehinde 2.74 m/s    E/A ratio 0.73     E wave deceleration time 213.88 msec    MV "A" wave duration 110.191771283747881 msec    LV SEPTAL E/E' RATIO 11.60 m/s    LV LATERAL E/E' RATIO 5.80 m/s    PV Peak S Kehinde 0.56 m/s    PV Peak D Kehinde 0.36 m/s    Pulm vein S/D ratio 1.56     LVOT peak kehinde 1.14 m/s    Left Ventricular Outflow Tract Mean Velocity 0.69 cm/s    Left Ventricular Outflow Tract Mean Gradient 2.30 mmHg    RVDD 3.63 cm    RV S' 15.04 cm/s    TAPSE 1.86 cm    LA size 3.85 cm    Left Atrium Minor Axis 5.20 cm    Left Atrium Major Axis 4.99 cm    LA volume " (mod) 47.09 cm3    LA Volume Index (Mod) 22.5 mL/m2    RA Major Axis 4.84 cm    RA Width 3.56 cm    AV regurgitation pressure 1/2 time 740.277149583386106 ms    AR Max Kehinde 2.88 m/s    AV mean gradient 7 mmHg    AV peak gradient 13 mmHg    Ao peak kehinde 1.80 m/s    Ao VTI 31.90 cm    LVOT peak VTI 20.90 cm    AV valve area 3.81 cm²    AV Velocity Ratio 0.63     AV index (prosthetic) 0.66     DAMION by Velocity Ratio 3.68 cm²    MV peak gradient 3 mmHg    MV stenosis pressure 1/2 time 62.02 ms    MV valve area p 1/2 method 3.55 cm2    MV valve area by continuity eq 6.36 cm2    MV VTI 19.1 cm    Triscuspid Valve Regurgitation Peak Gradient 30 mmHg    PV PEAK VELOCITY 1.01 m/s    PV peak gradient 4 mmHg    Pulmonary Valve Mean Velocity 0.76 m/s    Sinus 3.87 cm    STJ 3.77 cm    Ascending aorta 3.68 cm    IVC diameter 1.19 cm    Mean e' 0.08 m/s    ZLVIDS 0.06     ZLVIDD -1.83     LA Volume Index 31.1 mL/m2    LA volume 65.00 cm3    LA WIDTH 3.9 cm    TV resting pulmonary artery pressure 33 mmHg    RV TB RVSP 6 mmHg    Est. RA pres 3 mmHg    EF 55 %   POCT glucose    Collection Time: 01/08/24 11:41 AM   Result Value Ref Range    POCT Glucose 91 70 - 110 mg/dL   CBC auto differential    Collection Time: 01/08/24 12:11 PM   Result Value Ref Range    WBC 11.76 3.90 - 12.70 K/uL    RBC 3.19 (L) 4.60 - 6.20 M/uL    Hemoglobin 9.4 (L) 14.0 - 18.0 g/dL    Hematocrit 29.1 (L) 40.0 - 54.0 %    MCV 91 82 - 98 fL    MCH 29.5 27.0 - 31.0 pg    MCHC 32.3 32.0 - 36.0 g/dL    RDW 17.5 (H) 11.5 - 14.5 %    Platelets 294 150 - 450 K/uL    MPV 9.4 9.2 - 12.9 fL    Immature Granulocytes 0.4 0.0 - 0.5 %    Gran # (ANC) 8.6 (H) 1.8 - 7.7 K/uL    Immature Grans (Abs) 0.05 (H) 0.00 - 0.04 K/uL    Lymph # 2.4 1.0 - 4.8 K/uL    Mono # 0.7 0.3 - 1.0 K/uL    Eos # 0.0 0.0 - 0.5 K/uL    Baso # 0.05 0.00 - 0.20 K/uL    nRBC 0 0 /100 WBC    Gran % 72.8 38.0 - 73.0 %    Lymph % 20.1 18.0 - 48.0 %    Mono % 6.1 4.0 - 15.0 %    Eosinophil % 0.2 0.0 - 8.0 %  "   Basophil % 0.4 0.0 - 1.9 %    Differential Method Automated    POCT glucose    Collection Time: 01/08/24  6:38 PM   Result Value Ref Range    POCT Glucose 163 (H) 70 - 110 mg/dL   POCT glucose    Collection Time: 01/08/24 10:53 PM   Result Value Ref Range    POCT Glucose 101 70 - 110 mg/dL   CBC auto differential    Collection Time: 01/09/24  4:54 AM   Result Value Ref Range    WBC 8.50 3.90 - 12.70 K/uL    RBC 2.72 (L) 4.60 - 6.20 M/uL    Hemoglobin 8.1 (L) 14.0 - 18.0 g/dL    Hematocrit 24.2 (L) 40.0 - 54.0 %    MCV 89 82 - 98 fL    MCH 29.8 27.0 - 31.0 pg    MCHC 33.5 32.0 - 36.0 g/dL    RDW 17.3 (H) 11.5 - 14.5 %    Platelets 247 150 - 450 K/uL    MPV 9.5 9.2 - 12.9 fL    Immature Granulocytes 0.5 0.0 - 0.5 %    Gran # (ANC) 5.7 1.8 - 7.7 K/uL    Immature Grans (Abs) 0.04 0.00 - 0.04 K/uL    Lymph # 2.2 1.0 - 4.8 K/uL    Mono # 0.5 0.3 - 1.0 K/uL    Eos # 0.1 0.0 - 0.5 K/uL    Baso # 0.05 0.00 - 0.20 K/uL    nRBC 0 0 /100 WBC    Gran % 66.7 38.0 - 73.0 %    Lymph % 25.4 18.0 - 48.0 %    Mono % 5.6 4.0 - 15.0 %    Eosinophil % 1.2 0.0 - 8.0 %    Basophil % 0.6 0.0 - 1.9 %    Differential Method Automated    Basic Metabolic Panel    Collection Time: 01/09/24  4:54 AM   Result Value Ref Range    Sodium 135 (L) 136 - 145 mmol/L    Potassium 4.1 3.5 - 5.1 mmol/L    Chloride 106 95 - 110 mmol/L    CO2 22 (L) 23 - 29 mmol/L    Glucose 92 70 - 110 mg/dL    BUN 18 8 - 23 mg/dL    Creatinine 0.8 0.5 - 1.4 mg/dL    Calcium 8.5 (L) 8.7 - 10.5 mg/dL    Anion Gap 7 (L) 8 - 16 mmol/L    eGFR >60 >60 mL/min/1.73 m^2   Prealbumin    Collection Time: 01/09/24 10:13 AM   Result Value Ref Range    Prealbumin 9 (L) 20 - 43 mg/dL      No results found for: "PHENYTOIN", "PHENOBARB", "VALPROATE", "CBMZ"      EXAMINATION    VITALS   Vitals:    01/09/24 1200 01/09/24 1300 01/09/24 1400 01/09/24 1500   BP: 136/89  114/73 120/85   BP Location:       Patient Position:       Pulse: 71 85 81 83   Resp: 18 (!) 26 (!) 24 (!) 37   Temp: 97.7 " "°F (36.5 °C)      TempSrc: Oral      SpO2: (!) 92% 98% 100% (!) 94%   Weight:       Height:            CONSTITUTIONAL  General Appearance: NAD, unremarkable, age appropriate, lying in bed, overweight, calm    MUSCULOSKELETAL  Muscle Strength and Tone: WNL    Abnormal Involuntary Movements: none observed   Gait and Station: Attempted but unable to assess due to medical acuity     PSYCHIATRIC   Behavior/Cooperation:  cooperative, eye contact intermittent, calm, withdrawn   Speech:  normal rate, normal pitch, normal volume, monotone  Language: grossly intact, able to name, able to repeat with spontaneous speech  Mood:  "sort of existing I guess"  Affect:  constricted / blunted   Associations: intact; no HARINDER  Thought Process: Linear   Thought Content: denies SI, HI, AH, VH, TH, delusions, or paranoia (no RIS observed)   Sensorium:  Awake  Alert and Oriented: to person, place, city, state, month, year, and situation  Memory: 3/3 immediate, 2/3 at 5 minutes    Recent: Intact; able to report recent events   Remote: Intact; Named 4/4 past presidents   Attention/concentration: Intact. Appropriate for age/education. Able to spell w-[id number] & [id number]-[id number].   Similarities: Intact (difference between apple and orange?)  Abstract reasoning: Intact  Fund of Knowledge: Named 4/4 past presidents  Insight: Intact  Judgment: Intact    CAM ICU Delirium Assessment - NEGATIVE    Is the patient aware of the biomedical complications associated with substance abuse and mental illness? yes        MEDICAL DECISION MAKING    ASSESSMENT        Major Depressive Disorder, Single Episode, Severe, Without Psychosis        RECOMMENDATIONS       - With reasonable medical certainty, the patient does not currently meet PEC criteria due to not being an imminent threat to self/others and not being gravely disabled 2/2 mental illness at this time.      - With reasonable medical certainty, based on a present state examination, the patient currently appears to " have medical decision-making capacity as made evident by his ability to cognitively utilize information provided to him to express a choice that is stable over time, to understand the relevant information pertaining his diagnoses and recommended treatments, to appreciate the consequences of the decision (risks vs benefits) regarding accepting vs refusing treatment, and to manipulate all of the data in a logical fashion.      - Begin Prozac 10 mg PO daily for depression / motivation / activation and Remeron 7.5 mg PO QHS for appetite / sleep / mood (discussed risks/benefits/alt vs no treatment with patient).     - Psychotherapy was performed with patient as noted above with a focus on improving depression and safe / mature coping given stressors / adjustment.     - Patient's most recent resulted labs, imaging, and EKG were reviewed today.    - Agree with Palliative Care Team that a family meeting with the patient and his daughters to discuss goals of care would be very beneficial at this point (patient is in agreement).      - See Palliative Care Team assessment and recs.       - Please have Hospital CM/SW assist patient with asap outpatient mental health f/u for s/p discharge from this facility.      - Patient was instructed to call 911 and/or [phone] and return to the nearest ED if he begins feeling suicidal, homicidal, or gravely disabled (for s/p this hospitalization).       - Thank you for this consult ; will continue to follow patient while at [facility] [place] [facility]         Total time spent with patient and/or managing/coordinating patient's care today (excluding the time spent on psychotherapy): 77 minutes   Time spent on psychotherapy today (as noted above): 21 minutes   Total time for encounter today including psychotherapy: 98 minutes      More than 50% of the time was spent counseling/coordinating care.     Consulting clinician was informed of the encounter and consult note.       STAFF:  Eric West, MD [facility]   1/9/2024

## 2024-01-09 NOTE — CONSULTS
[facility]  INPATIENT Consult    REASON FOR CONSULT/ADMISSION: Gastric mass    HPI: Pino Nance is a 73 y.o. male with recent admission for fatigue, lightheadedness, hematemesis, and significant weight loss.  Pt has not seen doctor in 40 years.  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  PreAlbumin is 9.  Pt states food just does not taste good.  He states he is not sure he wants to keep living.    I have reviewed the patient's chart including prior progress notes, procedures and testing.     ROS:   Review of Systems   All other systems reviewed and are negative.      PROBLEM LIST:  Patient Active Problem List   Diagnosis    Hematemesis with nausea    Hypotension    Emphysematous gastritis    Anemia    Tobacco abuse    Lung nodules    Hyperglycemia    Lactic acidosis    Sepsis    Emphysema lung    Weight loss    Acute gastric ulcer with hemorrhage    Malignant neoplasm of cardia of stomach         HISTORY  History reviewed. No pertinent past medical history.    Past Surgical History:   Procedure Laterality Date    ESOPHAGOGASTRODUODENOSCOPY N/A 1/8/2024    Procedure: EGD (ESOPHAGOGASTRODUODENOSCOPY);  Surgeon: Alfredo Harper MD;  Location: [facility];  Service: Endoscopy;  Laterality: N/A;       Social History     Tobacco Use    Smoking status: Every Day     Current packs/day: 1.00     Average packs/day: 1 pack/day for 58.0 years (58.0 ttl pk-yrs)     Types: Cigarettes     Start date: 1966   Substance Use Topics    Alcohol use: Not Currently    Drug use: Never       Family History   Family history unknown: Yes         MEDS:  No current facility-administered medications on file prior to encounter.     Current Outpatient Medications on File Prior to Encounter   Medication Sig Dispense Refill    ibuprofen (ADVIL,MOTRIN) 200 MG tablet Take 200 mg by mouth every 6 (six) hours as needed for Pain.         ALLERGIES:  Review of patient's allergies indicates:  No Known  Allergies      VITALS:  Temp:  [97.7 °F (36.5 °C)-98.4 °F (36.9 °C)] 97.7 °F (36.5 °C)  Pulse:  [63-90] 69  Resp:  [16-40] 18  SpO2:  [76 %-100 %] 100 %  BP: ()/(63-77) 130/75    I/O last 3 completed shifts:  In: 1134.6 [P.O.:270; I.V.:43.2; Blood:306.3; IV Piggyback:515.1]  Out: 2776 [Urine:2775; Stool:1]      PHYSICAL EXAM:  Physical Exam  Constitutional:       General: He is not in acute distress.     Appearance: Normal appearance. He is ill-appearing.   HENT:      Head: Normocephalic and atraumatic.   Pulmonary:      Effort: Pulmonary effort is normal.   Abdominal:      Palpations: Abdomen is soft.   Skin:     General: Skin is warm and dry.   Neurological:      Mental Status: He is alert. Mental status is at baseline.   Psychiatric:         Mood and Affect: Mood normal.         Behavior: Behavior normal.           LABS:  Lab Results   Component Value Date    WBC 8.50 01/09/2024    RBC 2.72 (L) 01/09/2024    HGB 8.1 (L) 01/09/2024    HCT 24.2 (L) 01/09/2024     01/09/2024     Lab Results   Component Value Date    GLU 92 01/09/2024     (L) 01/09/2024    K 4.1 01/09/2024     01/09/2024    CO2 22 (L) 01/09/2024    BUN 18 01/09/2024    CREATININE 0.8 01/09/2024    CALCIUM 8.5 (L) 01/09/2024     Lab Results   Component Value Date    ALT 10 01/07/2024    AST 14 01/07/2024    ALKPHOS 49 (L) 01/07/2024    BILITOT 0.2 01/07/2024     Lab Results   Component Value Date    MG 2.2 01/07/2024       STUDIES:  CT, EGD images and reports were personally reviewed.        ASSESSMENT & PLAN:  73 y.o. 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  Recommend Psych and Palliative care consult given his statement about not wanting to continue living.  He if does desire treatment he would need to see Surgical Oncology at main campus due to the proximal position of the tumor and likely require PICC/TPN for malnutrition.  Will sign off, please call if  needs to be seen again.      Salty Reyes M.D., F.A.CAlemS.  [facility] and General Surgery  [facility] [place] [facility] [place]

## 2024-01-09 NOTE — PLAN OF CARE
Problem: Physical Therapy  Goal: Physical Therapy Goal  Description: Goals to be met by: 24     Patient will increase functional independence with mobility by performin. Supine to sit with Modified Maple  2. Sit to supine with Modified Maple  3. Sit to stand transfer with Modified Maple with use of RW  4. Bed to chair transfer with Modified Maple using Rolling Walker  5. Gait  x 150 feet with Modified Maple using Rolling Walker.     Outcome: Ongoing, Progressing     PT evaluation performed. Pt was previously Independent with no AD. Pt at this time require CGA/SBA with use of RW. Therapy recommending low intensity therapy. Therapy will continue to progress pt as able.

## 2024-01-10 VITALS
OXYGEN SATURATION: 98 % | TEMPERATURE: 98 F | RESPIRATION RATE: 25 BRPM | HEIGHT: 71 IN | DIASTOLIC BLOOD PRESSURE: 68 MMHG | WEIGHT: 195.13 LBS | BODY MASS INDEX: 27.32 KG/M2 | HEART RATE: 72 BPM | SYSTOLIC BLOOD PRESSURE: 101 MMHG

## 2024-01-10 PROBLEM — A41.9 SEPSIS: Status: RESOLVED | Noted: 2024-01-07 | Resolved: 2024-01-10

## 2024-01-10 PROBLEM — Z51.5 PALLIATIVE CARE ENCOUNTER: Status: ACTIVE | Noted: 2024-01-10

## 2024-01-10 LAB
BASOPHILS # BLD AUTO: 0.05 K/UL (ref 0–0.2)
BASOPHILS NFR BLD: 0.6 % (ref 0–1.9)
BLD PROD TYP BPU: NORMAL
BLOOD UNIT EXPIRATION DATE: NORMAL
BLOOD UNIT TYPE CODE: 6200
BLOOD UNIT TYPE: NORMAL
CODING SYSTEM: NORMAL
CROSSMATCH INTERPRETATION: NORMAL
DIFFERENTIAL METHOD BLD: ABNORMAL
DISPENSE STATUS: NORMAL
EOSINOPHIL # BLD AUTO: 0.2 K/UL (ref 0–0.5)
EOSINOPHIL NFR BLD: 2 % (ref 0–8)
ERYTHROCYTE [DISTWIDTH] IN BLOOD BY AUTOMATED COUNT: 16.9 % (ref 11.5–14.5)
HCT VFR BLD AUTO: 24.7 % (ref 40–54)
HGB BLD-MCNC: 8.3 G/DL (ref 14–18)
IMM GRANULOCYTES # BLD AUTO: 0.04 K/UL (ref 0–0.04)
IMM GRANULOCYTES NFR BLD AUTO: 0.5 % (ref 0–0.5)
LYMPHOCYTES # BLD AUTO: 2.7 K/UL (ref 1–4.8)
LYMPHOCYTES NFR BLD: 34.6 % (ref 18–48)
MCH RBC QN AUTO: 29.6 PG (ref 27–31)
MCHC RBC AUTO-ENTMCNC: 33.6 G/DL (ref 32–36)
MCV RBC AUTO: 88 FL (ref 82–98)
MONOCYTES # BLD AUTO: 0.5 K/UL (ref 0.3–1)
MONOCYTES NFR BLD: 5.9 % (ref 4–15)
NEUTROPHILS # BLD AUTO: 4.5 K/UL (ref 1.8–7.7)
NEUTROPHILS NFR BLD: 56.4 % (ref 38–73)
NRBC BLD-RTO: 0 /100 WBC
PLATELET # BLD AUTO: 240 K/UL (ref 150–450)
PMV BLD AUTO: 9.1 FL (ref 9.2–12.9)
RBC # BLD AUTO: 2.8 M/UL (ref 4.6–6.2)
TRANS ERYTHROCYTES VOL PATIENT: NORMAL ML
WBC # BLD AUTO: 7.91 K/UL (ref 3.9–12.7)

## 2024-01-10 PROCEDURE — 63600175 PHARM REV CODE 636 W HCPCS: Mod: HCNC | Performed by: HOSPITALIST

## 2024-01-10 PROCEDURE — 85025 COMPLETE CBC W/AUTO DIFF WBC: CPT | Mod: HCNC | Performed by: HOSPITALIST

## 2024-01-10 PROCEDURE — C9113 INJ PANTOPRAZOLE SODIUM, VIA: HCPCS | Mod: HCNC | Performed by: HOSPITALIST

## 2024-01-10 PROCEDURE — 25000003 PHARM REV CODE 250: Mod: HCNC | Performed by: PSYCHIATRY & NEUROLOGY

## 2024-01-10 PROCEDURE — 25000003 PHARM REV CODE 250: Mod: HCNC | Performed by: STUDENT IN AN ORGANIZED HEALTH CARE EDUCATION/TRAINING PROGRAM

## 2024-01-10 PROCEDURE — 25000003 PHARM REV CODE 250: Mod: HCNC | Performed by: HOSPITALIST

## 2024-01-10 PROCEDURE — 99497 ADVNCD CARE PLAN 30 MIN: CPT | Mod: HCNC,,,

## 2024-01-10 PROCEDURE — 27000221 HC OXYGEN, UP TO 24 HOURS: Mod: HCNC

## 2024-01-10 PROCEDURE — 99233 SBSQ HOSP IP/OBS HIGH 50: CPT | Mod: HCNC,,,

## 2024-01-10 PROCEDURE — 99900035 HC TECH TIME PER 15 MIN (STAT): Mod: HCNC

## 2024-01-10 PROCEDURE — 36415 COLL VENOUS BLD VENIPUNCTURE: CPT | Mod: HCNC | Performed by: HOSPITALIST

## 2024-01-10 RX ORDER — LANOLIN ALCOHOL/MO/W.PET/CERES
1000 CREAM (GRAM) TOPICAL DAILY
Qty: 30 TABLET | Refills: 2 | Status: SHIPPED | OUTPATIENT
Start: 2024-01-11

## 2024-01-10 RX ORDER — MIRTAZAPINE 7.5 MG/1
7.5 TABLET, FILM COATED ORAL NIGHTLY
Qty: 30 TABLET | Refills: 2 | Status: SHIPPED | OUTPATIENT
Start: 2024-01-10 | End: 2024-04-01 | Stop reason: SDUPTHER

## 2024-01-10 RX ORDER — FLUOXETINE 10 MG/1
10 CAPSULE ORAL DAILY
Qty: 30 CAPSULE | Refills: 2 | Status: SHIPPED | OUTPATIENT
Start: 2024-01-11 | End: 2024-04-17 | Stop reason: SDUPTHER

## 2024-01-10 RX ORDER — PANTOPRAZOLE SODIUM 40 MG/1
40 TABLET, DELAYED RELEASE ORAL DAILY
Qty: 30 TABLET | Refills: 2 | Status: SHIPPED | OUTPATIENT
Start: 2024-01-10 | End: 2024-04-01 | Stop reason: SDUPTHER

## 2024-01-10 RX ORDER — IBUPROFEN 200 MG
1 TABLET ORAL DAILY
Qty: 28 PATCH | Refills: 1 | Status: SHIPPED | OUTPATIENT
Start: 2024-01-10

## 2024-01-10 RX ADMIN — FLUOXETINE 10 MG: 10 CAPSULE ORAL at 08:01

## 2024-01-10 RX ADMIN — FOLIC ACID 1 MG: 1 TABLET ORAL at 08:01

## 2024-01-10 RX ADMIN — PIPERACILLIN AND TAZOBACTAM 4.5 G: 4; .5 INJECTION, POWDER, LYOPHILIZED, FOR SOLUTION INTRAVENOUS; PARENTERAL at 03:01

## 2024-01-10 RX ADMIN — PANTOPRAZOLE SODIUM 40 MG: 40 INJECTION, POWDER, LYOPHILIZED, FOR SOLUTION INTRAVENOUS at 08:01

## 2024-01-10 RX ADMIN — CYANOCOBALAMIN TAB 1000 MCG 1000 MCG: 1000 TAB at 08:01

## 2024-01-10 RX ADMIN — MUPIROCIN: 20 OINTMENT TOPICAL at 08:01

## 2024-01-10 NOTE — SUBJECTIVE & OBJECTIVE
Interval History: No acute events reported overnight. Pt seems to be in better spirits today, requesting to go home.     Medications:  Continuous Infusions:  Scheduled Meds:   cyanocobalamin  1,000 mcg Oral Daily    FLUoxetine  10 mg Oral Daily    folic acid  1 mg Oral Daily    LIDOcaine  1 patch Transdermal Q24H    mirtazapine  7.5 mg Oral QHS    mupirocin   Nasal BID    nicotine  1 patch Transdermal Daily    pantoprazole  40 mg Intravenous BID     PRN Meds:0.9%  NaCl infusion (for blood administration), 0.9%  NaCl infusion (for blood administration), sodium chloride 0.9%, dextrose 10%, dextrose 10%, HYDROcodone-acetaminophen    Objective:     Vital Signs (Most Recent):  Temp: 97.6 °F (36.4 °C) (01/10/24 1130)  Pulse: 88 (01/10/24 1200)  Resp: (!) 39 (01/10/24 1200)  BP: (!) 124/90 (01/10/24 1200)  SpO2: 99 % (01/10/24 1200) Vital Signs (24h Range):  Temp:  [97.6 °F (36.4 °C)-98.3 °F (36.8 °C)] 97.6 °F (36.4 °C)  Pulse:  [62-88] 88  Resp:  [15-44] 39  SpO2:  [90 %-100 %] 99 %  BP: ()/(59-90) 124/90     Weight: 88.5 kg (195 lb 1.7 oz)  Body mass index is 27.21 kg/m².     Physical Exam  Constitutional:       General: He is not in acute distress.     Appearance: He is obese. He is ill-appearing. He is not toxic-appearing or diaphoretic.   HENT:      Head: Normocephalic and atraumatic.     Eyes:      Extraocular Movements: Extraocular Cardiovascular:      Rate and Rhythm: Normal rate and regular rhythm.     Pulmonary:      Effort: Pulmonary effort is normal.      Breath sounds: Normal breath sounds. No wheezing or rales.   Abdominal:      General: Abdomen is flat. There is no distension.      Tenderness: There is abdominal tenderness. There is no guarding.   Musculoskeletal:         General: Tenderness (medial joint lines Skin:     General: Skin is warm and dry.      Coloration: Skin is pale and sallow. Skin is not jaundiced.   Neurological:      General: No focal deficit present.       Motor: weakness.       Coordination: Coordination normal.         Attention and Perception: Attention and perception normal.         Mood and Affect: Mood is depressed. Affect is flat.  slightly improved     Speech: Speech normal.         Behavior: Behavior normal.         Thought Content: Thought content normal.         Cognition and Memory: Cognition and memory normal.         Judgment: Judgment normal.        Review of Symptoms      Symptom Assessment (ESAS 0-10 Scale)  Pain:  0  Dyspnea:  0  Anxiety:  2  Nausea:  0  Depression:  5  Anorexia:  0  Fatigue:  5  Insomnia:  0  Restlessness:  2  Agitation:  0         Performance Status:  40    Living Arrangements:  Lives with family and Lives in home    Psychosocial/Cultural:   See Palliative Psychosocial Note: No  Social Issues Identified: Coping deficit pt/family, New Diagnosis/Trauma, and Mental Health  Bereavement Risk: No  Caregiver Needs Discussed. Caregiver Distress: Yes: Issues of guilt, Intensity of family caregiving, Caregiver Burnout Risk, and Caregiver support and community resources discussed.    Cultural: none identified   **Primary  to Follow**  Palliative Care  Consult: No    Spiritual:  C - Community:  Family support   A - Address in Care:  Wife with advancing dementia      Time-Based Charting:  Yes  Chart Review: 21 minutes  Face to Face: 25 minutes  Symptom Assessment: 12 minutes  Coordination of Care: 10 minutes  Discharge Planning: 10 minutes  Advance Care Plannin minutes  Goals of Care: 15 minutes    Total Time Spent: 93 minutes        Advance Care Planning   Advance Directives:   Living Will: No    [place]: No    Medical Power of : Yes      Decision Making:  Patient answered questions and Family answered questions  Goals of Care: The patient, family, and healthcare power of   endorses that what is most important right now is to focus on spending time at home, avoiding the hospital, remaining as independent as possible,  "symptom/pain control, quality of life, even if it means sacrificing a little time, improvement in condition but with limits to invasive therapies, and comfort and QOL     Accordingly, we have decided that the best plan to meet the patient's goals includes enrolling in hospice care           Significant Labs: All pertinent labs within the past 24 hours have been reviewed.  CBC:   Recent Labs   Lab 01/10/24  0420   WBC 7.91   HGB 8.3*   HCT 24.7*   MCV 88        BMP:  No results for input(s): "GLU", "NA", "K", "CL", "CO2", "BUN", "CREATININE", "CALCIUM", "MG" in the last 24 hours.  LFT:  Lab Results   Component Value Date    AST 14 01/07/2024    ALKPHOS 49 (L) 01/07/2024    BILITOT 0.2 01/07/2024     Albumin:   Albumin   Date Value Ref Range Status   01/07/2024 2.1 (L) 3.5 - 5.2 g/dL Final     Protein:   Total Protein   Date Value Ref Range Status   01/07/2024 4.3 (L) 6.0 - 8.4 g/dL Final     Lactic acid:   Lab Results   Component Value Date    LACTATE 2.1 01/07/2024    LACTATE 2.7 (H) 01/07/2024       Significant Imaging: I have reviewed all pertinent imaging results/findings within the past 24 hours.  " Labs/Medications/Imaging Studies

## 2024-01-10 NOTE — ASSESSMENT & PLAN NOTE
73M with PMH of extensive tobacco use (116 pk/yr) c/b COPD admitted to  service for syncope 2/2 chronic GI blood loss anemia with workup positive for a large ulcerating gastric mass highly worrisome for locally advanced primary gastric adenocarcinoma. Biopsy to confirm working dx is in process. Palliative consulted for goals of care.     1/10/2024    -Met with pt and pts daughter Carol at the bedside.  Discussed plan of care moving forward.  Pt reports that while he is still hesitant to consider any type of cancer directed therapies, he is agreeable to follow up with oncology outpatient to what treatment options are available.   -Discussed benefits of enrolling in home hospice services at this time to aid pt in symptom management and provide family support. Pt agreeable to meet with home hospice representative.  -Daughter in agreement with plan, pt does not want to update wife on possible cancer diagnosis until confirmed due to her advanced dementia.   -Pt will discharge home with home hospice services today and will follow up with oncology outpatient.  At this time, pt still stating he does not want cancer directed treatments but is agreeable to initial follow up oncology appointment once biopsy results are available.   -Pt agreeable to continue medications initiated by psychiatry.       1/9/2024  Pt is presenting late due to major depression which is reactive to both his spouse's advancing dementia and his own acutely failing health. He is overall a poor candidate for any cancer directed treatment given poor home environment and financial stress. Agree with surgery assessment that resection is not out of the question but pt would have to accept TPN which he is likely to refuse.     Pt is visibly depressed and apathetic and while I do not question his intellectual capacity to decline cancer directed treatment I am concerned that he is overwhelmed and not fully weighing his options. I have requested his family  present to bedside for an in person discussion of surgical and/or medical cancer treatment vs transitioning to comfort focused care under home hospice.     Recommendations:  Medical: supportive care  Symptom Management:   # Reactive depression  - owing to spouse's severe dementia and new probable cancer dx  - start mirtazapine 7.5mg qhs for mood, appetite, sleep  Psychosocial: active major depression  Legal: has now designated two adult daughters as durable HCPOA  Prognosis: < 6 months for locally advanced gastric cancer and qualified for hospice at any poin

## 2024-01-10 NOTE — HPI
"Per initial note, "Disease Process: Cancer, Advanced Respiratory Disease     73M with reported PMH of 116 pk/yr tobacco use c/b untreated COPD otherwise unknown hx and no medical contact x 40 years who was admitted to the  service for syncope.     Chief Complaint Leading to Admission     Pt reported several months of early satiety, dysgeusia and anorexia leading to massive but unquantified weight loss. On 1/7 he developed new vomiting and lost consciousness after standing up in the bathroom, awoke on the ground with acute left knee pain and presented to [facility] ED for evaluation.     Arrived with sat 77% on RA and BP 74/40 with abdominal tenderness. Started on 15L NRB and bolused IVF 3.8L with BP corrected. CT abdomen/pelvis was markedly abnormal with a large and complex ulcerative gastric mass at the lesser curvature, abutting and possibly invading the pancreatic tail given nearby signs of inflammation.     Started on vancomycin to cover intra-abdominal infection. GI was consulted and recommended pt be kept NPO for diagnostic EGD. Admitted to ICU. Had melanotic stool overnight.     Interval Hospital Course     1/8: EGD confirmed a large ulcerated mass in the gastric cardia highly suspicious for malignancy. Biopsied. Family advised of suspected dx of gastric adenocarcinoma. Pt reports his spouse is demented and cannot make decisions on his behalf. Does not have a formal HCPOA established as yet.     1/9: Home health referral sent. Palliative has been consulted for goals of care.     At time of interview pt is up to chair in [facility], AAOx3, chronically ill appearing with pale and sallow skin. 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     About one year ago pt's spouse's dementia began progressing and he was no longer able to leave the house on motorcycle trips. Their two daughters live in the home. The " "older (Carol) is there all the time; the younger (Yana) is working full time. He reports their house is already cramped and over-occupied and is unsure whether he can accept home health services or home hospice for this reason.     Pt is aware of working dx of gastric cancer awaiting formal path. Advised pt that conventional chemotherapy for this disease is generally burdensome and may well have risks > benefits particularly in light of pt's questionable social support. Immunotherapy would likely have a far better risk/benefit ratio if pt is a candidate however we are awaiting further information from pathology to determine eligibility. Surgical resection is not off the table based on the localized appearance on CT and would probably have the greatest benefit / risk ratio if attempted.     Patient was visibly depressed through the entire visit and repeatedly asked "what's the point" re: cancer-directed treatments. Attempted to establish rapport and identify any pleasurable activities or interests however pt does not engage much or volunteer information. He has used marijuana in the past but declines Marinol. He complains of left knee pain since the fall which is known secondary to a bone contusion but declines pain medication. He is not sure if he can ambulate unassisted today due to the pain.      He has now designated both of his daughters as HCPOA due to his spouse's incapacity.     ---     With pt's permission contacted his daughter Carol who confirms the above details and agrees that the house is very small and cannot accommodate any further equipment. She reports pt has appeared depressed throughout this entire year due to his wife's advancing dementia; she does not always recognize him anymore and he is appropriately grieving for her loss of personality. She states that pt has always been very tight-lipped about his health and did not mention any illness to them until the syncopal episode "forced his " "hand".      Advised Carol that despite the poor home environment pt would meaningfully benefit from home hospice care RN visits to the home as pt is historically med averse and may not request help otherwise. If pt wishes for cancer directed treatment he is free to withdraw from hospice at any time without consequences. Pt will not accept placement nor does he have financial backing and so options are very limited.     Carol reports that she, her sister, and pt's spouse will present tomorrow for a visit and the palliative team will further discuss the risks and benefits of home hospice with pt. Deferred code status discussion today as pt is tearful and despondent; revisit with family support."     "

## 2024-01-10 NOTE — PLAN OF CARE
[facility]  Department of Hospital Medicine  [address] [place] [address]  [phone] after hours  [phone] fax    HOSPICE  ORDERS    01/10/2024    Admit to Hospice:  Home Service     Diagnoses:   Active Hospital Problems    Diagnosis  POA    *Acute gastric ulcer with hemorrhage [K25.0]  Yes    Malignant neoplasm of stomach [C16.9]  Yes    Hematemesis with nausea [K92.0]  Yes    Hypotension [I95.9]  Yes    Emphysematous gastritis [K29.60]  Yes    Anemia [D64.9]  Yes    Tobacco abuse [Z72.0]  Yes    Lung nodules [R91.8]  Yes    Hyperglycemia [R73.9]  Yes    Lactic acidosis [E87.20]  Yes    Emphysema lung [J43.9]  Yes    Weight loss [R63.4]  Yes      Resolved Hospital Problems    Diagnosis Date Resolved POA    Sepsis [A41.9] 01/10/2024 Yes       Hospice Qualifying Diagnoses:        Patient has a life expectancy < 6 months due to:  Primary Hospice Diagnosis: Suspected malignant gastric malignancy   Comorbid Conditions Contributing to Decline: Nicotine dependence, emphysema     Vital Signs: Routine per Hospice Protocol.    Code Status: Full code     Allergies: Review of patient's allergies indicates:  No Known Allergies    Diet: Regular diet as tolerated     Activities: As tolerated    Goals of Care Treatment Preferences:  Code Status: Full Code    Health care agent: Kevin Nance (daughter)  Health care agent number: [phone]          What is most important right now is to focus on spending time at home, avoiding the hospital, remaining as independent as possible, symptom/pain control, quality of life, even if it means sacrificing a little time.  Accordingly, we have decided that the best plan to meet the patient's goals includes continuing with treatment.      Nursing: Per Hospice Routine.      Malave Care: Empty Malave bag Q shift and PRN.  Change Malave every month.    Oxygen: for comfort as needed         Medication List        START taking these  medications      cyanocobalamin 1000 MCG tablet  Commonly known as: VITAMIN B-12  Take 1 tablet (1,000 mcg total) by mouth once daily.  Start taking on: January 11, 2024     FLUoxetine 10 MG capsule  Take 1 capsule (10 mg total) by mouth once daily.  Start taking on: January 11, 2024     mirtazapine 7.5 MG Tab  Commonly known as: REMERON  Take 1 tablet (7.5 mg total) by mouth every evening.     nicotine 21 mg/24 hr  Commonly known as: NICODERM CQ  Place 1 patch onto the skin once daily.     pantoprazole 40 MG tablet  Commonly known as: PROTONIX  Take 1 tablet (40 mg total) by mouth once daily.            STOP taking these medications      ibuprofen 200 MG tablet  Commonly known as: MIGUE HOFFMAN                  Future Orders:  Hospice Medical Director may dictate new orders for comfortable care measures & sign death certificate.        _________________________________  Andria Smith MD  01/10/2024

## 2024-01-10 NOTE — PROGRESS NOTES
The sw met with the pt and his dtr Brittaney will transport him home at d/c. The pt's discharging home with [facility] and they will deliver the dme to the pt's home. The pt has no further Case Management needs and is clear to d/c.     [facility] - Intensive Care  Discharge Final Note    Primary Care Provider: No primary care provider on file.    Expected Discharge Date: 1/10/2024    Final Discharge Note (most recent)       Final Note - 01/10/24 1220          Post-Acute Status    Post-Acute Authorization Hospice     Home Health Status Set-up Complete/Auth obtained                     Important Message from Medicare             Contact Info       Rl Weinberg MD   Specialty: Hematology and Oncology    [address]  [address]  [place] [address]   Phone: [phone]       Next Steps: Follow up on 1/17/2024    Instructions: 8:00am HSP F/U    [facility] [place]   Specialty: Hospice Services    [address]  [address]  [place] [address]   Phone: [phone]       Next Steps: Follow up    Instructions: The pt will dishcrge home with the hospice company listed above.

## 2024-01-10 NOTE — PLAN OF CARE
Patient on oxygen with documented flow.  Will attempt to wean per O2 order protocol. Plan of care on going.

## 2024-01-10 NOTE — PROGRESS NOTES
[facility] - Intensive Care  Palliative Medicine  Progress Note    Patient Name: Pino Nance  MRN: [id number]  Admission Date: 1/7/2024  Hospital Length of Stay: 3 days  Code Status: Full Code   Attending Provider: Andria Smith MD  Consulting Provider: Rama Travis NP  Primary Care Physician: No primary care provider on file.  Principal Problem:Acute gastric ulcer with hemorrhage    Patient information was obtained from patient, relative(s), past medical records, and primary team.      Assessment/Plan:     Palliative Care  Palliative care encounter  73M with PMH of extensive tobacco use (116 pk/yr) c/b COPD admitted to  service for syncope 2/2 chronic GI blood loss anemia with workup positive for a large ulcerating gastric mass highly worrisome for locally advanced primary gastric adenocarcinoma. Biopsy to confirm working dx is in process. Palliative consulted for goals of care.     1/10/2024    -Met with pt and pts daughter Carol at the bedside.  Discussed plan of care moving forward.  Pt reports that while he is still hesitant to consider any type of cancer directed therapies, he is agreeable to follow up with oncology outpatient to what treatment options are available.   -Discussed benefits of enrolling in home hospice services at this time to aid pt in symptom management and provide family support. Pt agreeable to meet with home hospice representative.  -Daughter in agreement with plan, pt does not want to update wife on possible cancer diagnosis until confirmed due to her advanced dementia.   -Pt will discharge home with home hospice services today and will follow up with oncology outpatient.  At this time, pt still stating he does not want cancer directed treatments but is agreeable to initial follow up oncology appointment once biopsy results are available.   -Pt agreeable to continue medications initiated by psychiatry.       1/9/2024  Pt is presenting late due to major depression which is reactive  "to both his spouse's advancing dementia and his own acutely failing health. He is overall a poor candidate for any cancer directed treatment given poor home environment and financial stress. Agree with surgery assessment that resection is not out of the question but pt would have to accept TPN which he is likely to refuse.     Pt is visibly depressed and apathetic and while I do not question his intellectual capacity to decline cancer directed treatment I am concerned that he is overwhelmed and not fully weighing his options. I have requested his family present to bedside for an in person discussion of surgical and/or medical cancer treatment vs transitioning to comfort focused care under home hospice.     Recommendations:  Medical: supportive care  Symptom Management:   # Reactive depression  - owing to spouse's severe dementia and new probable cancer dx  - start mirtazapine 7.5mg qhs for mood, appetite, sleep  Psychosocial: active major depression  Legal: has now designated two adult daughters as durable HCPOA  Prognosis: < 6 months for locally advanced gastric cancer and qualified for hospice at any poin        I will sign off. Please contact us if you have any additional questions.    Subjective:     Chief Complaint:   Chief Complaint   Patient presents with    Hypotension     Pt arrived via EMS from home, with complains of generalized weakness/ and x 1 emesis, EMS reports pt has not seen a MD in 40 years, and has no medical hx, sats via EMS= 82% on non re breather, with b/p - 74/40 in the field        HPI:   Per initial note, "Disease Process: Cancer, Advanced Respiratory Disease     73M with reported PMH of 116 pk/yr tobacco use c/b untreated COPD otherwise unknown hx and no medical contact x 40 years who was admitted to the  service for syncope.     Chief Complaint Leading to Admission     Pt reported several months of early satiety, dysgeusia and anorexia leading to massive but unquantified weight loss. On " 1/7 he developed new vomiting and lost consciousness after standing up in the bathroom, awoke on the ground with acute left knee pain and presented to [facility] ED for evaluation.     Arrived with sat 77% on RA and BP 74/40 with abdominal tenderness. Started on 15L NRB and bolused IVF 3.8L with BP corrected. CT abdomen/pelvis was markedly abnormal with a large and complex ulcerative gastric mass at the lesser curvature, abutting and possibly invading the pancreatic tail given nearby signs of inflammation.     Started on vancomycin to cover intra-abdominal infection. GI was consulted and recommended pt be kept NPO for diagnostic EGD. Admitted to ICU. Had melanotic stool overnight.     Interval Hospital Course     1/8: EGD confirmed a large ulcerated mass in the gastric cardia highly suspicious for malignancy. Biopsied. Family advised of suspected dx of gastric adenocarcinoma. Pt reports his spouse is demented and cannot make decisions on his behalf. Does not have a formal HCPOA established as yet.     1/9: Home health referral sent. Palliative has been consulted for goals of care.     At time of interview pt is up to chair in [facility], AAOx3, chronically ill appearing with pale and sallow skin. 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     About one year ago pt's spouse's dementia began progressing and he was no longer able to leave the house on motorcycle trips. Their two daughters live in the home. The older (Carol) is there all the time; the younger (Yana) is working full time. He reports their house is already cramped and over-occupied and is unsure whether he can accept home health services or home hospice for this reason.     Pt is aware of working dx of gastric cancer awaiting formal path. Advised pt that conventional chemotherapy for this disease is generally burdensome and may well have risks >  "benefits particularly in light of pt's questionable social support. Immunotherapy would likely have a far better risk/benefit ratio if pt is a candidate however we are awaiting further information from pathology to determine eligibility. Surgical resection is not off the table based on the localized appearance on CT and would probably have the greatest benefit / risk ratio if attempted.     Patient was visibly depressed through the entire visit and repeatedly asked "what's the point" re: cancer-directed treatments. Attempted to establish rapport and identify any pleasurable activities or interests however pt does not engage much or volunteer information. He has used marijuana in the past but declines Marinol. He complains of left knee pain since the fall which is known secondary to a bone contusion but declines pain medication. He is not sure if he can ambulate unassisted today due to the pain.      He has now designated both of his daughters as HCPOA due to his spouse's incapacity.     ---     With pt's permission contacted his daughter Carol who confirms the above details and agrees that the house is very small and cannot accommodate any further equipment. She reports pt has appeared depressed throughout this entire year due to his wife's advancing dementia; she does not always recognize him anymore and he is appropriately grieving for her loss of personality. She states that pt has always been very tight-lipped about his health and did not mention any illness to them until the syncopal episode "forced his hand".      Advised Carol that despite the poor home environment pt would meaningfully benefit from home hospice care RN visits to the home as pt is historically med averse and may not request help otherwise. If pt wishes for cancer directed treatment he is free to withdraw from hospice at any time without consequences. Pt will not accept placement nor does he have financial backing and so options are very " "limited.     Carol reports that she, her sister, and pt's spouse will present tomorrow for a visit and the palliative team will further discuss the risks and benefits of home hospice with pt. Deferred code status discussion today as pt is tearful and despondent; revisit with family support."       Interval History: No acute events reported overnight. Pt seems to be in better spirits today, requesting to go home.     Medications:  Continuous Infusions:  Scheduled Meds:   cyanocobalamin  1,000 mcg Oral Daily    FLUoxetine  10 mg Oral Daily    folic acid  1 mg Oral Daily    LIDOcaine  1 patch Transdermal Q24H    mirtazapine  7.5 mg Oral QHS    mupirocin   Nasal BID    nicotine  1 patch Transdermal Daily    pantoprazole  40 mg Intravenous BID     PRN Meds:0.9%  NaCl infusion (for blood administration), 0.9%  NaCl infusion (for blood administration), sodium chloride 0.9%, dextrose 10%, dextrose 10%, HYDROcodone-acetaminophen    Objective:     Vital Signs (Most Recent):  Temp: 97.6 °F (36.4 °C) (01/10/24 1130)  Pulse: 88 (01/10/24 1200)  Resp: (!) 39 (01/10/24 1200)  BP: (!) 124/90 (01/10/24 1200)  SpO2: 99 % (01/10/24 1200) Vital Signs (24h Range):  Temp:  [97.6 °F (36.4 °C)-98.3 °F (36.8 °C)] 97.6 °F (36.4 °C)  Pulse:  [62-88] 88  Resp:  [15-44] 39  SpO2:  [90 %-100 %] 99 %  BP: ()/(59-90) 124/90     Weight: 88.5 kg (195 lb 1.7 oz)  Body mass index is 27.21 kg/m².     Physical Exam  Constitutional:       General: He is not in acute distress.     Appearance: He is obese. He is ill-appearing. He is not toxic-appearing or diaphoretic.   HENT:      Head: Normocephalic and atraumatic.     Eyes:      Extraocular Movements: Extraocular Cardiovascular:      Rate and Rhythm: Normal rate and regular rhythm.     Pulmonary:      Effort: Pulmonary effort is normal.      Breath sounds: Normal breath sounds. No wheezing or rales.   Abdominal:      General: Abdomen is flat. There is no distension.      Tenderness: There is " abdominal tenderness. There is no guarding.   Musculoskeletal:         General: Tenderness (medial joint lines Skin:     General: Skin is warm and dry.      Coloration: Skin is pale and sallow. Skin is not jaundiced.   Neurological:      General: No focal deficit present.       Motor: weakness.      Coordination: Coordination normal.         Attention and Perception: Attention and perception normal.         Mood and Affect: Mood is depressed. Affect is flat.  slightly improved     Speech: Speech normal.         Behavior: Behavior normal.         Thought Content: Thought content normal.         Cognition and Memory: Cognition and memory normal.         Judgment: Judgment normal.        Review of Symptoms      Symptom Assessment (ESAS 0-10 Scale)  Pain:  0  Dyspnea:  0  Anxiety:  2  Nausea:  0  Depression:  5  Anorexia:  0  Fatigue:  5  Insomnia:  0  Restlessness:  2  Agitation:  0         Performance Status:  40    Living Arrangements:  Lives with family and Lives in home    Psychosocial/Cultural:   See Palliative Psychosocial Note: No  Social Issues Identified: Coping deficit pt/family, New Diagnosis/Trauma, and Mental Health  Bereavement Risk: No  Caregiver Needs Discussed. Caregiver Distress: Yes: Issues of guilt, Intensity of family caregiving, Caregiver Burnout Risk, and Caregiver support and community resources discussed.    Cultural: none identified   **Primary  to Follow**  Palliative Care  Consult: No    Spiritual:  C - Community:  Family support   A - Address in Care:  Wife with advancing dementia      Time-Based Charting:  Yes  Chart Review: 21 minutes  Face to Face: 25 minutes  Symptom Assessment: 12 minutes  Coordination of Care: 10 minutes  Discharge Planning: 10 minutes  Advance Care Plannin minutes  Goals of Care: 15 minutes    Total Time Spent: 93 minutes        Advance Care Planning  Advance Directives:   Living Will: No    [place]: No    Medical Power of : Yes   "    Decision Making:  Patient answered questions and Family answered questions  Goals of Care: The patient, family, and healthcare power of   endorses that what is most important right now is to focus on spending time at home, avoiding the hospital, remaining as independent as possible, symptom/pain control, quality of life, even if it means sacrificing a little time, improvement in condition but with limits to invasive therapies, and comfort and QOL     Accordingly, we have decided that the best plan to meet the patient's goals includes enrolling in hospice care           Significant Labs: All pertinent labs within the past 24 hours have been reviewed.  CBC:   Recent Labs   Lab 01/10/24  0420   WBC 7.91   HGB 8.3*   HCT 24.7*   MCV 88        BMP:  No results for input(s): "GLU", "NA", "K", "CL", "CO2", "BUN", "CREATININE", "CALCIUM", "MG" in the last 24 hours.  LFT:  Lab Results   Component Value Date    AST 14 01/07/2024    ALKPHOS 49 (L) 01/07/2024    BILITOT 0.2 01/07/2024     Albumin:   Albumin   Date Value Ref Range Status   01/07/2024 2.1 (L) 3.5 - 5.2 g/dL Final     Protein:   Total Protein   Date Value Ref Range Status   01/07/2024 4.3 (L) 6.0 - 8.4 g/dL Final     Lactic acid:   Lab Results   Component Value Date    LACTATE 2.1 01/07/2024    LACTATE 2.7 (H) 01/07/2024       Significant Imaging: I have reviewed all pertinent imaging results/findings within the past 24 hours.    Rama Travis NP  Palliative Medicine  [facility] - Intensive Care    Advance Care Planning     Date: 01/10/2024    [facility]  I engaged the patient and family in a voluntary conversation about advance care planning and we specifically addressed what the goals of care would be moving forward, in light of the patient's change in clinical status, specifically newly gastric cancer.  We did specifically address the patient's likely prognosis, which is fair .  We explored the patient's values and preferences for future care.  The " patient and family endorses that what is most important right now is to focus on spending time at home, avoiding the hospital, remaining as independent as possible, symptom/pain control, and quality of life, even if it means sacrificing a little time    Accordingly, we have decided that the best plan to meet the patient's goals includes enrolling in hospice care    I did explain the role for hospice care at this stage of the patient's illness, including its ability to help the patient live with the best quality of life possible.  We will be making a hospice referral.    I spent a total of 15 minutes engaging the patient in this advance care planning discussion.

## 2024-01-10 NOTE — NURSING
Patient with orders to discharge. AVS printed and reviewed with patient. Medication administration also reviewed. Patient verbalizes understanding. PIV removed. Patient escorted to discharge lobby via RN. All belonging with patient.

## 2024-01-10 NOTE — PLAN OF CARE
Problem: Adult Inpatient Plan of Care  Goal: Plan of Care Review  Outcome: Ongoing, Progressing   A&Ox4. Vital signs stable. No complaints of pain this shift. Tolerating PO intake. Urine output adequate. Repositioned independently with minimal assistance. Safety precautions in place. Plan of care reviewed with patient.

## 2024-01-10 NOTE — PLAN OF CARE
The sw met with Janet from [facility] who states she met with the pt and his dtr Jessica and they want home hospice. The pt signed all the consents. She states they will deliver the dme to the pt's home but he can d/c home before it arrives b/c the pt's ready to go home. The sw sent a secure chat to Dr. Smith to write the d/c order for home hospice. Janet will be able to extract the orders via EPIC.     1:03pm The sw spoke to Janet via phone to notify her the d/c orders are in. She states she will informed her office so they can print them. The sw spoke to the pt's dtr Jessica and she will come back to transport the pt home at d/c. The sw met with the pt at bedside and he's in agreement to d/c home with [facility]. The pt signed the Pt Choice form and the sw placed the original in his chart and gave him a copy.        01/10/24 1220   Post-Acute Status   Post-Acute Authorization Hospice   Home Health Status Set-up Complete/Auth obtained   Discharge Plan   Discharge Plan A Hospice/home

## 2024-01-11 ENCOUNTER — HOSPITAL ENCOUNTER (EMERGENCY)
Facility: HOSPITAL | Age: 74
Discharge: HOME | End: 2024-01-11
Attending: EMERGENCY MEDICINE
Payer: MEDICARE

## 2024-01-11 ENCOUNTER — PATIENT OUTREACH (OUTPATIENT)
Dept: ADMINISTRATIVE | Facility: CLINIC | Age: 74
End: 2024-01-11
Payer: MEDICARE

## 2024-01-11 VITALS
HEART RATE: 89 BPM | RESPIRATION RATE: 28 BRPM | SYSTOLIC BLOOD PRESSURE: 75 MMHG | OXYGEN SATURATION: 100 % | DIASTOLIC BLOOD PRESSURE: 52 MMHG

## 2024-01-11 DIAGNOSIS — R00.0 TACHYCARDIA: ICD-10-CM

## 2024-01-11 DIAGNOSIS — Z51.5 HOSPICE CARE PATIENT: Primary | ICD-10-CM

## 2024-01-11 PROCEDURE — 99281 EMR DPT VST MAYX REQ PHY/QHP: CPT | Mod: HCNC

## 2024-01-11 NOTE — HOSPITAL COURSE
"73-year-old male with PMH nicotine dependence presented with hematemesis, lightheadedness found to have large malignant ulcer concerning for adenocarcinoma on EGD.  Patient was seen by General surgery and Oncology.  Recommended outpatient follow up with Dr. Weinberg and Surgical Oncology.  Patient also expressed unwillingness to pursue treatment and that he rather die.  He was seen by Psychiatry and palliative care team.  Patient deemed to have medical decision-making capacity by Psychiatry.  Started on SSRI.  Family met with palliative care team and decision made for patient to enroll with home hospice.  He would like to follow up with Dr. Weinberg to see what his treatment options are but will likely not pursue any aggressive treatment.  Discuss in case patient would like to pursue aggressive treatment, he understands can come off hospice.    /68 (BP Location: Left arm, Patient Position: Lying)   Pulse 72   Temp 97.6 °F (36.4 °C) (Oral)   Resp (!) 25   Ht 5' 11" (1.803 m)   Wt 88.5 kg (195 lb 1.7 oz)   SpO2 98%   BMI 27.21 kg/m²     Physical Exam  Vitals reviewed.   Constitutional:       General: He is not in acute distress.  HENT:      Mouth/Throat:      Comments: Poor dentition  Cardiovascular:      Rate and Rhythm: Normal rate and regular rhythm.      Heart sounds: Normal heart sounds.   Pulmonary:      Effort: Pulmonary effort is normal. No respiratory distress.      Breath sounds: Normal breath sounds.   Abdominal:      General: There is no distension.      Palpations: Abdomen is soft.      Tenderness: There is no abdominal tenderness   Musculoskeletal:         General: No deformity.   Skin:     General: Skin is warm and dry.      Findings: No rash.   Neurological:      Mental Status: He is alert and oriented to person, place, and time.   Psychiatric:      Comments: Flat affect   "

## 2024-01-11 NOTE — ED NOTES
"Daughter expresses readiness to bring pt home   States " I just want to respect his wishes"   Pt able to stand and transfer to  with assistance    "

## 2024-01-11 NOTE — DISCHARGE SUMMARY
[facility] Medicine  Discharge Summary      Patient Name: Pino Nance  MRN: [id number]  RAJAN: [id number]  Patient Class: IP- Inpatient  Admission Date: 1/7/2024  Hospital Length of Stay: 3 days  Discharge Date and Time: 1/10/2024  3:20 PM  Attending Physician: No att. providers found   Discharging Provider: Andria Smith MD  Primary Care Provider: No primary care provider on file.    Primary Care Team: Networked reference to record PCT     HPI:   Mr. Nance is a 73-year-old man with tobacco use disorder who presents with episode of hematemesis and lightheadedness.  He states that he has not seen a doctor in almost 40 years and is on no home medications including NSAIDs.  States that over the past several months he has been having stomach pain associated with decreased appetite.  Also reports the food does not taste as good as normal and that he has had a bad taste in his mouth that stays there over this time.  States that he used to be a size 52 waist but that now his pants are no longer fitting.  He does not weigh himself so unsure how much weight he has lost.    Procedure(s) (LRB):  EGD (ESOPHAGOGASTRODUODENOSCOPY) (N/A)      Hospital Course:   73-year-old male with PMH nicotine dependence presented with hematemesis, lightheadedness found to have large malignant ulcer concerning for adenocarcinoma on EGD.  Patient was seen by General surgery and Oncology.  Recommended outpatient follow up with Dr. Weinberg and Surgical Oncology.  Patient also expressed unwillingness to pursue treatment and that he rather die.  He was seen by Psychiatry and palliative care team.  Patient deemed to have medical decision-making capacity by Psychiatry.  Started on SSRI.  Family met with palliative care team and decision made for patient to enroll with home hospice.  He would like to follow up with Dr. Weinberg to see what his treatment options are but will likely not pursue any aggressive treatment.   "Discuss in case patient would like to pursue aggressive treatment, he understands can come off hospice.    /68 (BP Location: Left arm, Patient Position: Lying)   Pulse 72   Temp 97.6 °F (36.4 °C) (Oral)   Resp (!) 25   Ht 5' 11" (1.803 m)   Wt 88.5 kg (195 lb 1.7 oz)   SpO2 98%   BMI 27.21 kg/m²     Physical Exam  Vitals reviewed.   Constitutional:       General: He is not in acute distress.  HENT:      Mouth/Throat:      Comments: Poor dentition  Cardiovascular:      Rate and Rhythm: Normal rate and regular rhythm.      Heart sounds: Normal heart sounds.   Pulmonary:      Effort: Pulmonary effort is normal. No respiratory distress.      Breath sounds: Normal breath sounds.   Abdominal:      General: There is no distension.      Palpations: Abdomen is soft.      Tenderness: There is no abdominal tenderness   Musculoskeletal:         General: No deformity.   Skin:     General: Skin is warm and dry.      Findings: No rash.   Neurological:      Mental Status: He is alert and oriented to person, place, and time.   Psychiatric:      Comments: Flat affect      Goals of Care Treatment Preferences:  Code Status: Full Code    Health care agent: Kevin Nance (daughter)  Health care agent number: [phone]          What is most important right now is to focus on spending time at home, avoiding the hospital, remaining as independent as possible, symptom/pain control, quality of life, even if it means sacrificing a little time.  Accordingly, we have decided that the best plan to meet the patient's goals includes enrolling in hospice care.      Consults:   Consults (From admission, onward)          Status Ordering Provider     Inpatient consult to Palliative Care  Once        Provider:  Jose Atkins Jr., MD    Completed BOONE ROTHMAN     Inpatient consult to Psychiatry  Once        Provider:  Jett Hernandez MD    Completed BOONE ROTHMAN     Inpatient consult to Hematology/Oncology  Once      "   Provider:  Rl Weinberg MD Completed WHITEHEAD, ALEXANDER S.     Inpatient consult to General Surgery  Once        Provider:  Salty Reyes MD    Completed RICARDO LAM     Inpatient consult to Gastroenterology-[facility]  Once        Provider:  (Not yet assigned)    RICARDO Purcell              Final Active Diagnoses:    Diagnosis Date Noted POA    PRINCIPAL PROBLEM:  Acute gastric ulcer with hemorrhage [K25.0] 01/08/2024 Yes    Palliative care encounter [Z51.5] 01/10/2024 Not Applicable    Malignant neoplasm of stomach [C16.9] 01/08/2024 Yes    Hematemesis with nausea [K92.0] 01/07/2024 Yes    Hypotension [I95.9] 01/07/2024 Yes    Emphysematous gastritis [K29.60] 01/07/2024 Yes    Anemia [D64.9] 01/07/2024 Yes    Tobacco abuse [Z72.0] 01/07/2024 Yes    Lung nodules [R91.8] 01/07/2024 Yes    Hyperglycemia [R73.9] 01/07/2024 Yes    Lactic acidosis [E87.20] 01/07/2024 Yes    Emphysema lung [J43.9] 01/07/2024 Yes    Weight loss [R63.4] 01/07/2024 Yes      Problems Resolved During this Admission:    Diagnosis Date Noted Date Resolved POA    Sepsis [A41.9] 01/07/2024 01/10/2024 Yes       Discharged Condition: stable    Disposition: Home or Self Care    Follow Up:   Follow-up Information       Rl Weinberg MD Follow up on 1/17/2024.    Specialty: Hematology and Oncology  Why: 8:00am HSP F/U  Contact information:  [address]  [address]  [place] [id number] [phone]               [facility] - Follow up.    Specialty: Hospice Services  Why: The pt will dishcrge home with the hospice company listed above.  Contact information:  [address]  [address]  [place] [id number] [phone]                           Patient Instructions:      Ambulatory referral/consult to Hematology / Oncology   Standing Status: Future   Referral Priority: Routine Referral Type: Consultation   Referral Reason: Specialty Services Required   Requested Specialty: Hematology  and Oncology   Number of Visits Requested: 1     Ambulatory referral/consult to Smoking Cessation Program   Standing Status: Future   Referral Priority: Routine Referral Type: Consultation   Referral Reason: Specialty Services Required   Requested Specialty: CTTS   Number of Visits Requested: 1       Significant Diagnostic Studies: Labs: BMP:   Recent Labs   Lab 01/09/24  0454   GLU 92   *   K 4.1      CO2 22*   BUN 18   CREATININE 0.8   CALCIUM 8.5*    and CBC   Recent Labs   Lab 01/09/24  0454 01/10/24  0420   WBC 8.50 7.91   HGB 8.1* 8.3*   HCT 24.2* 24.7*    240       Pending Diagnostic Studies:       None           Medications:  Reconciled Home Medications:      Medication List        START taking these medications      cyanocobalamin 1000 MCG tablet  Commonly known as: VITAMIN B-12  Take 1 tablet (1,000 mcg total) by mouth once daily.  Start taking on: January 11, 2024     FLUoxetine 10 MG capsule  Take 1 capsule (10 mg total) by mouth once daily.  Start taking on: January 11, 2024     mirtazapine 7.5 MG Tab  Commonly known as: REMERON  Take 1 tablet (7.5 mg total) by mouth every evening.     nicotine 21 mg/24 hr  Commonly known as: NICODERM CQ  Place 1 patch onto the skin once daily.     pantoprazole 40 MG tablet  Commonly known as: PROTONIX  Take 1 tablet (40 mg total) by mouth once daily.            STOP taking these medications      ibuprofen 200 MG tablet  Commonly known as: ADVIL,MOTRIN              Indwelling Lines/Drains at time of discharge:   Lines/Drains/Airways       None                   Time spent on the discharge of patient: 38 minutes      Andria Smith MD  Department of Hospital Medicine  [facility] [place] [facility]

## 2024-01-11 NOTE — ED PROVIDER NOTES
Encounter Date: 1/11/2024       History   No chief complaint on file.    73-year-old male with past medical history as below brought in by EMS from home due to concern about hematemesis.  EMS reports that patient had 1 small volume episode of hematemesis and has been hypotensive to the 70s systolic in route to the ER. They administered 250 cc en route to the ED. Unable to obtain additional history from patient due to altered mental status.  I called patient's daughter on the phone, she says that patient was on hospice care.  She says that she tried to tell EMS this but her brother had already called 911 because patient's wife, who has dementia, got concerned when patient appeared confused earlier.  Patient's daughter says that patient did not want to come to the hospital, is DNR/DNI, and would not want blood transfusions. She says that they have everything set up for him at home for home hospice.    The history is provided by the patient, the EMS personnel and a relative.     Review of patient's allergies indicates:  No Known Allergies  No past medical history on file.  Past Surgical History:   Procedure Laterality Date    ESOPHAGOGASTRODUODENOSCOPY N/A 1/8/2024    Procedure: EGD (ESOPHAGOGASTRODUODENOSCOPY);  Surgeon: Alfredo Harper MD;  Location: [facility];  Service: Endoscopy;  Laterality: N/A;     Family History   Family history unknown: Yes     Social History     Tobacco Use    Smoking status: Every Day     Current packs/day: 1.00     Average packs/day: 1 pack/day for 58.0 years (58.0 ttl pk-yrs)     Types: Cigarettes     Start date: 1966   Substance Use Topics    Alcohol use: Not Currently    Drug use: Never     Review of Systems    Physical Exam     Initial Vitals   BP Pulse Resp Temp SpO2   -- -- -- -- --      MAP       --         Physical Exam    Constitutional: He appears well-developed. No distress.   Lying on stretcher with eyes closed.   HENT:   Head: Normocephalic and atraumatic.   Eyes: EOM are  normal.   Neck: Neck supple.   Cardiovascular:  Normal rate.           Pulmonary/Chest: No respiratory distress.   Abdominal: He exhibits no distension.   Musculoskeletal:         General: No tenderness.      Cervical back: Neck supple.     Neurological: He is alert.   Skin: Skin is warm and dry.         ED Course   Procedures  Labs Reviewed - No data to display       Imaging Results    None          Medications - No data to display  Medical Decision Making  73-year-old male discharged yesterday after being placed on hospice care brought in by EMS from home due to concern about hematemesis.  EMS reports that patient had 1 small volume episode of hematemesis and has been hypotensive to the 70s systolic in route to the ER.  Unable to obtain additional history from patient due to altered mental status.  I called patient's daughter on the phone, she confirms that patient is on hospice care.  She says that she tried to tell EMS this but her brother had already called 911 because patient's wife, who has dementia, got concerned when patient appeared confused earlier.  Patient's daughter says that patient did not want to come to the hospital, is DNR/DNI, and would not want blood transfusions. She says that they have everything set up for him at home for home hospice. MARIAJOSE Franco, spoke with Janet through [facility] who confirmed that the hospice team will continue to care for the patient. Pt's two daughters came to the ED to take him back home.      Amount and/or Complexity of Data Reviewed  Independent Historian: EMS     Details: And daughter as documented above   External Data Reviewed: notes.     Details: Discharged yesterday, placed on hospice care     Risk  Decision regarding hospitalization.  Decision not to resuscitate or to de-escalate care because of poor prognosis.               ED Course as of 01/11/24 1647   Thu Jan 11, 2024   1631 BP in the 60's systolic. Pt already has an IV so administering 500 cc IVF  while waiting for family to arrive. [AT]      ED Course User Index  [AT] Aida Britton MD                           Clinical Impression:  Final diagnoses:  [Z51.5] Hospice care patient (Primary)          ED Disposition Condition    Discharge Stable          ED Prescriptions    None       Follow-up Information       Follow up With Specialties Details Why Contact Info    [facility] - Emergency Dept Emergency Medicine  As needed, If symptoms worsen [address] [facility] [address]  [phone]             Aida Britton MD  01/11/24 7838

## 2024-01-11 NOTE — DISCHARGE INSTRUCTIONS

## 2024-01-12 LAB
BACTERIA BLD CULT: NORMAL
BACTERIA BLD CULT: NORMAL

## 2024-01-15 NOTE — PROGRESS NOTES
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 Anemia in neoplastic disease    3. Folate deficiency    4. Nicotine dependence, cigarettes, uncomplicated    5. Advance care planning    6. Malignant neoplasm of stomach, unspecified location        Chief Complaint: Gastric Cancer (/)      Oncologic History:      Oncologic History Gastric cancer - staging in process      Oncologic Treatment To be determined      Pathology 1/8/24:  GASTRIC MASS, BIOPSY:   Adenocarcinoma, with mucinous signet ring cell features.           Subjective:    History of Present Illness: Mr. Nance is a 73 y.o. male who presents for evaluation and management of gastric cancer. I had seen him during a hospitalization in January 2024.    - he was was admitted on 1/7/24 for hypotension/hemetemesis. Imaging revealed a possible gastroesophageal/gastric mass     - imaging (1/7/24) revealed a "large lesser curvature gastric ulcer with erosion into and inflammation of the adjacent pancreatic tail/pancreatitis."  - upper GI endoscopy (1/8/24) revealed "extrinsic narrowing of the GE junction attributed to malignant compression from a gastric adenocarcinoma. Large (5cm x 3cm), penetrating ulcer of the gastric cardia and proximal body which is typical for adenocarcinoma although lymphoma is also possible - biopsies obtained lesser curvature gastric ulcer with erosion into and inflammation of the adjacent pancreatic tail/pancreatitis."  - he was discharged with home hospice. He came today to understand what his treatment options are and decide on coming off hospice.    - Today, he endorses fatigue, dizziness, weakness, diarrhea, poor PO intake due to taste changes, unintentional weight loss, upper abdominal pain.   - his main concern is his wife's Alzheimer's dementia. He and his daughter feel that they are unable to take care of her and are considering placing her in a " home.    Past medical, surgical, family, and social histories have been reviewed and updated below.    Past Medical History: No past medical history on file.    Past Surgical History:   Past Surgical History:   Procedure Laterality Date    ESOPHAGOGASTRODUODENOSCOPY N/A 1/8/2024    Procedure: EGD (ESOPHAGOGASTRODUODENOSCOPY);  Surgeon: Alfredo Harper MD;  Location: [facility];  Service: Endoscopy;  Laterality: N/A;       Family History:   Family History   Family history unknown: Yes       Social History:  reports that he has been smoking cigarettes. He started smoking about 58 years ago. He has a 58.0 pack-year smoking history. He does not have any smokeless tobacco history on file. He reports that he does not currently use alcohol. He reports that he does not use drugs.    Allergies:  Review of patient's allergies indicates:  No Known Allergies    Medications:  Current Outpatient Medications   Medication Sig Dispense Refill    cyanocobalamin (VITAMIN B-12) 1000 MCG tablet Take 1 tablet (1,000 mcg total) by mouth once daily. 30 tablet 2    FLUoxetine 10 MG capsule Take 1 capsule (10 mg total) by mouth once daily. 30 capsule 2    mirtazapine (REMERON) 7.5 MG Tab Take 1 tablet (7.5 mg total) by mouth every evening. 30 tablet 2    nicotine (NICODERM CQ) 21 mg/24 hr Place 1 patch onto the skin once daily. 28 patch 1    pantoprazole (PROTONIX) 40 MG tablet Take 1 tablet (40 mg total) by mouth once daily. 30 tablet 2     No current facility-administered medications for this visit.       Review of Systems   Constitutional:  Positive for fatigue and unexpected weight change.   HENT:  Negative for sore throat.    Eyes:  Negative for visual disturbance.   Respiratory:  Positive for shortness of breath.    Cardiovascular:  Negative for chest pain.   Gastrointestinal:  Positive for abdominal pain and diarrhea.   Genitourinary:  Negative for dysuria.   Musculoskeletal:  Negative for back pain.   Skin:  Negative for rash.    Neurological:  Positive for dizziness and weakness. Negative for headaches.   Hematological:  Negative for adenopathy.   Psychiatric/Behavioral:  The patient is not nervous/anxious.        ECOG Performance Status:   ECOG SCORE    1 - Restricted in strenuous activity-ambulatory and able to carry out work of a light nature         Objective:      Vitals:   Vitals:    01/17/24 0810   BP: 124/60   BP Location: Left arm   Patient Position: Sitting   BP Method: Medium (Automatic)   Pulse: 108   Resp: 16   SpO2: 96%   Weight: 90.7 kg (199 lb 15.3 oz)     BMI: Body mass index is 27.89 kg/m².    Physical Exam  Vitals and nursing note reviewed.   Constitutional:       Appearance: He is well-developed.   HENT:      Head: Normocephalic and atraumatic.   Eyes:      Pupils: Pupils are equal, round, and reactive to light.   Cardiovascular:      Rate and Rhythm: Regular rhythm. Tachycardia present.   Pulmonary:      Effort: Pulmonary effort is normal.      Breath sounds: Normal breath sounds.   Abdominal:      General: Bowel sounds are normal.      Palpations: Abdomen is soft.   Musculoskeletal:         General: Normal range of motion.      Cervical back: Normal range of motion and neck supple.   Skin:     General: Skin is warm and dry.   Neurological:      Mental Status: He is alert and oriented to person, place, and time.   Psychiatric:         Behavior: Behavior normal.         Thought Content: Thought content normal.         Judgment: Judgment normal.         Laboratory Data:  Labs have been reviewed.    Lab Results   Component Value Date    WBC 7.91 01/10/2024    HGB 8.3 (L) 01/10/2024    HCT 24.7 (L) 01/10/2024    MCV 88 01/10/2024     01/10/2024               Diagnostic Results:  Upper GI endoscopy (1/8/24):   - Extrinsic narrowing of the GE junction attributed to malignant compression from a gastric adenocarcinoma   - Large (5cm x 3cm), penetrating ulcer of the gastric cardia and proximal body which is typical for  "adenocarcinoma although lymphoma is also possible - biopsies obtained      CT abdomen/pelvis (1/7/24): I have personally reviewed the images  Findings suggestive of a large lesser curvature gastric ulcer with erosion into and inflammation of the adjacent pancreatic tail/pancreatitis.  Blood products are suspected within the gastric lumen.     Nonspecific liquid large bowel contents     Abdominal aortic aneurysm        CTA chest (1/7/24): I have personally reviewed the images  No evidence for aortic dissection or pulmonary embolus.     Abnormal appearance of the stomach with abnormal hyperdensity noted and questionable air within the stomach walls.  Component of emphysematous gastritis not excluded.  Additionally, there is stranding of the fat along the posterior in the inferior margin of the stomach that extends towards the pancreas for which pancreatitis cannot be excluded.  Further evaluation with a dedicated CT scan of the abdomen and pelvis with IV and oral contrast material is recommended.     At least 2 indeterminate hypodensities are seen in the left hepatic lobe.  This can be better evaluated with CT scan of the abdomen.     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 Anemia in neoplastic disease    3. Folate deficiency    4. Nicotine dependence, cigarettes, uncomplicated    5. Advance care planning           Plan:     Gastric cancer - staging in process  - I have reviewed his chart  - he was was admitted on 1/7/24 for hypotension/hemetemesis. Imaging revealed a possible gastroesophageal/gastric mass   - imaging (1/7/24) revealed a "large lesser curvature gastric ulcer with erosion into and inflammation of the adjacent pancreatic tail/pancreatitis."  - upper GI endoscopy (1/8/24) revealed "extrinsic narrowing of the GE junction attributed to malignant compression from a gastric adenocarcinoma. Large (5cm x 3cm), penetrating ulcer of the gastric cardia and proximal body which is typical " "for adenocarcinoma although lymphoma is also possible - biopsies obtained lesser curvature gastric ulcer with erosion into and inflammation of the adjacent pancreatic tail/pancreatitis."  - biopsy came back as "adenocarcinoma, with mucinous signet ring cell features."  - he was discharged with home hospice. He came today to understand what his treatment options are and decide on coming off hospice  - his main concern is his wife's Alzheimer's dementia. He and his daughter feel that they are unable to take care of her and are considering placing her in a home.  - he is unsure if he wants to proceed with therapy for his cancer.  - given that imaging suggests erosion into the pancreas, I suspect he will have unresectable disease.  - I will reach out to surgical oncology for an opinion  - send tissue for TEMPUS testing.  - check labs today.  - I reviewed various treatment options for localized gastric cancer, including neoadjuvant chemo, followed by surgery, followed by adjuvant chemo  - have him follow up with palliative care.  - return to clinic in 2 weeks to finalize management plan    2. Anemia in neoplastic disease / folate deficiency  - last hemoglobin was 8.3 g/dL  - folate (1/7/24) was decreased  - ferritin was 121 ng/mL  - I will arrange for iron sucrose if he decides to come off hospice.    3. Nicotine dependence  - he has been smoking since the age of 12-13.  - I encouraged cessation.    4. Advance Care Planning     Date: 01/17/2024    Power of   I initiated the process of voluntary advance care planning today and explained the importance of this process to the patient.  I introduced the concept of advance directives to the patient, as well. Then the patient received detailed information about the importance of designating a Health Care Power of  (HCPOA). He was also instructed to communicate with this person about their wishes for future healthcare, should he become sick and lose " decision-making capacity. The patient has not previously appointed a HCPOA. After our discussion, the patient has decided to complete a HCPOA and has appointed his  daughters Yana Nance ([phone]) and Carol Nance ([phone]) . I spent a total time of 16 minutes discussing this issue with the patient.            - return to clinic in 2 weeks to finalize management plan    Rl Weinberg M.D.  Hematology/Oncology  [facility] - [address], [address]  [place] [address]  Phone: [phone]  Fax: [phone]

## 2024-01-16 LAB
FINAL PATHOLOGIC DIAGNOSIS: ABNORMAL
GROSS: ABNORMAL
Lab: ABNORMAL
SUPPLEMENTAL DIAGNOSIS: ABNORMAL

## 2024-01-17 ENCOUNTER — PATIENT MESSAGE (OUTPATIENT)
Dept: HEMATOLOGY/ONCOLOGY | Facility: CLINIC | Age: 74
End: 2024-01-17
Payer: MEDICARE

## 2024-01-17 ENCOUNTER — LAB VISIT (OUTPATIENT)
Dept: LAB | Facility: HOSPITAL | Age: 74
End: 2024-01-17
Payer: MEDICARE

## 2024-01-17 ENCOUNTER — OFFICE VISIT (OUTPATIENT)
Dept: HEMATOLOGY/ONCOLOGY | Facility: CLINIC | Age: 74
End: 2024-01-17
Payer: MEDICARE

## 2024-01-17 VITALS
SYSTOLIC BLOOD PRESSURE: 124 MMHG | DIASTOLIC BLOOD PRESSURE: 60 MMHG | OXYGEN SATURATION: 96 % | RESPIRATION RATE: 16 BRPM | BODY MASS INDEX: 27.89 KG/M2 | HEART RATE: 108 BPM | WEIGHT: 199.94 LBS

## 2024-01-17 DIAGNOSIS — Z71.89 ADVANCE CARE PLANNING: ICD-10-CM

## 2024-01-17 DIAGNOSIS — C16.0 MALIGNANT NEOPLASM OF CARDIA OF STOMACH: Primary | ICD-10-CM

## 2024-01-17 DIAGNOSIS — D63.0 ANEMIA IN NEOPLASTIC DISEASE: ICD-10-CM

## 2024-01-17 DIAGNOSIS — C16.0 MALIGNANT NEOPLASM OF CARDIA OF STOMACH: ICD-10-CM

## 2024-01-17 DIAGNOSIS — E53.8 FOLATE DEFICIENCY: ICD-10-CM

## 2024-01-17 DIAGNOSIS — F17.210 NICOTINE DEPENDENCE, CIGARETTES, UNCOMPLICATED: ICD-10-CM

## 2024-01-17 LAB
ALBUMIN SERPL BCP-MCNC: 2.8 G/DL (ref 3.5–5.2)
ALP SERPL-CCNC: 63 U/L (ref 55–135)
ALT SERPL W/O P-5'-P-CCNC: 7 U/L (ref 10–44)
ANION GAP SERPL CALC-SCNC: 8 MMOL/L (ref 8–16)
AST SERPL-CCNC: 7 U/L (ref 10–40)
BASOPHILS # BLD AUTO: 0.03 K/UL (ref 0–0.2)
BASOPHILS NFR BLD: 0.3 % (ref 0–1.9)
BILIRUB SERPL-MCNC: 0.2 MG/DL (ref 0.1–1)
BUN SERPL-MCNC: 14 MG/DL (ref 8–23)
CALCIUM SERPL-MCNC: 9 MG/DL (ref 8.7–10.5)
CHLORIDE SERPL-SCNC: 105 MMOL/L (ref 95–110)
CO2 SERPL-SCNC: 24 MMOL/L (ref 23–29)
CREAT SERPL-MCNC: 1.1 MG/DL (ref 0.5–1.4)
DIFFERENTIAL METHOD BLD: ABNORMAL
EOSINOPHIL # BLD AUTO: 0.1 K/UL (ref 0–0.5)
EOSINOPHIL NFR BLD: 0.7 % (ref 0–8)
ERYTHROCYTE [DISTWIDTH] IN BLOOD BY AUTOMATED COUNT: 19.8 % (ref 11.5–14.5)
EST. GFR  (NO RACE VARIABLE): >60 ML/MIN/1.73 M^2
FERRITIN SERPL-MCNC: 112 NG/ML (ref 20–300)
GLUCOSE SERPL-MCNC: 244 MG/DL (ref 70–110)
HCT VFR BLD AUTO: 23.3 % (ref 40–54)
HGB BLD-MCNC: 7.5 G/DL (ref 14–18)
IMM GRANULOCYTES # BLD AUTO: 0.08 K/UL (ref 0–0.04)
IMM GRANULOCYTES NFR BLD AUTO: 0.9 % (ref 0–0.5)
IRON SERPL-MCNC: 24 UG/DL (ref 45–160)
LYMPHOCYTES # BLD AUTO: 1.4 K/UL (ref 1–4.8)
LYMPHOCYTES NFR BLD: 15.4 % (ref 18–48)
MAGNESIUM SERPL-MCNC: 2 MG/DL (ref 1.6–2.6)
MCH RBC QN AUTO: 30.7 PG (ref 27–31)
MCHC RBC AUTO-ENTMCNC: 32.2 G/DL (ref 32–36)
MCV RBC AUTO: 96 FL (ref 82–98)
MONOCYTES # BLD AUTO: 0.6 K/UL (ref 0.3–1)
MONOCYTES NFR BLD: 6.7 % (ref 4–15)
NEUTROPHILS # BLD AUTO: 7 K/UL (ref 1.8–7.7)
NEUTROPHILS NFR BLD: 76 % (ref 38–73)
NRBC BLD-RTO: 0 /100 WBC
PLATELET # BLD AUTO: 443 K/UL (ref 150–450)
PMV BLD AUTO: 9.1 FL (ref 9.2–12.9)
POTASSIUM SERPL-SCNC: 4.5 MMOL/L (ref 3.5–5.1)
PROT SERPL-MCNC: 5.5 G/DL (ref 6–8.4)
RBC # BLD AUTO: 2.44 M/UL (ref 4.6–6.2)
SATURATED IRON: 8 % (ref 20–50)
SODIUM SERPL-SCNC: 137 MMOL/L (ref 136–145)
TOTAL IRON BINDING CAPACITY: 320 UG/DL (ref 250–450)
TRANSFERRIN SERPL-MCNC: 216 MG/DL (ref 200–375)
WBC # BLD AUTO: 9.14 K/UL (ref 3.9–12.7)

## 2024-01-17 PROCEDURE — 1159F MED LIST DOCD IN RCRD: CPT | Mod: HCNC,CPTII,S$GLB, | Performed by: INTERNAL MEDICINE

## 2024-01-17 PROCEDURE — 3044F HG A1C LEVEL LT 7.0%: CPT | Mod: HCNC,CPTII,S$GLB, | Performed by: INTERNAL MEDICINE

## 2024-01-17 PROCEDURE — 1126F AMNT PAIN NOTED NONE PRSNT: CPT | Mod: HCNC,CPTII,S$GLB, | Performed by: INTERNAL MEDICINE

## 2024-01-17 PROCEDURE — 83735 ASSAY OF MAGNESIUM: CPT | Mod: HCNC | Performed by: INTERNAL MEDICINE

## 2024-01-17 PROCEDURE — 99215 OFFICE O/P EST HI 40 MIN: CPT | Mod: HCNC,S$GLB,, | Performed by: INTERNAL MEDICINE

## 2024-01-17 PROCEDURE — 3288F FALL RISK ASSESSMENT DOCD: CPT | Mod: HCNC,CPTII,S$GLB, | Performed by: INTERNAL MEDICINE

## 2024-01-17 PROCEDURE — 36415 COLL VENOUS BLD VENIPUNCTURE: CPT | Mod: HCNC | Performed by: INTERNAL MEDICINE

## 2024-01-17 PROCEDURE — 99999 PR PBB SHADOW E&M-EST. PATIENT-LVL III: CPT | Mod: PBBFAC,HCNC,, | Performed by: INTERNAL MEDICINE

## 2024-01-17 PROCEDURE — 3078F DIAST BP <80 MM HG: CPT | Mod: HCNC,CPTII,S$GLB, | Performed by: INTERNAL MEDICINE

## 2024-01-17 PROCEDURE — 1123F ACP DISCUSS/DSCN MKR DOCD: CPT | Mod: HCNC,CPTII,S$GLB, | Performed by: INTERNAL MEDICINE

## 2024-01-17 PROCEDURE — 1111F DSCHRG MED/CURRENT MED MERGE: CPT | Mod: HCNC,CPTII,S$GLB, | Performed by: INTERNAL MEDICINE

## 2024-01-17 PROCEDURE — 85025 COMPLETE CBC W/AUTO DIFF WBC: CPT | Mod: HCNC | Performed by: INTERNAL MEDICINE

## 2024-01-17 PROCEDURE — 1100F PTFALLS ASSESS-DOCD GE2>/YR: CPT | Mod: HCNC,CPTII,S$GLB, | Performed by: INTERNAL MEDICINE

## 2024-01-17 PROCEDURE — 83540 ASSAY OF IRON: CPT | Mod: HCNC | Performed by: INTERNAL MEDICINE

## 2024-01-17 PROCEDURE — 80053 COMPREHEN METABOLIC PANEL: CPT | Mod: HCNC | Performed by: INTERNAL MEDICINE

## 2024-01-17 PROCEDURE — 3008F BODY MASS INDEX DOCD: CPT | Mod: HCNC,CPTII,S$GLB, | Performed by: INTERNAL MEDICINE

## 2024-01-17 PROCEDURE — 82728 ASSAY OF FERRITIN: CPT | Mod: HCNC | Performed by: INTERNAL MEDICINE

## 2024-01-17 PROCEDURE — 1160F RVW MEDS BY RX/DR IN RCRD: CPT | Mod: HCNC,CPTII,S$GLB, | Performed by: INTERNAL MEDICINE

## 2024-01-17 PROCEDURE — 3074F SYST BP LT 130 MM HG: CPT | Mod: HCNC,CPTII,S$GLB, | Performed by: INTERNAL MEDICINE

## 2024-01-17 PROCEDURE — 99497 ADVNCD CARE PLAN 30 MIN: CPT | Mod: HCNC,S$GLB,, | Performed by: INTERNAL MEDICINE

## 2024-01-17 NOTE — Clinical Note
"Quick question. 73 M with newly diagnosed gastric cancer (mucinous signet ring features). Imaging revealed a "large lesser curvature gastric ulcer with erosion into and inflammation of the adjacent pancreatic tail/pancreatitis." No signs of distant metastatic disease. Lots of home issues going on, he's not sure he wants to proceed with anything. Question: if he were to proceed with anything, does the imaging of large lesser curvature gastric ulcer with erosion into and inflammation of adjacent pancreatic tail" mean unresectable? Or would you recommend endoscopic ultrasound, referral to you, etc?  Thanks so much!"

## 2024-01-25 LAB
DNA RANGE(S) EXAMINED NAR: NORMAL
GENE DIS ANL INTERP-IMP: POSITIVE
GENE DIS ASSESSED: NORMAL
GENE MUT TESTED BLD/T: 4.7 M/MB
MSI CA SPEC-IMP: NORMAL
ONEOME COMMENT: NORMAL
ONEOME METHOD: NORMAL
PD-L1 BY 22C3 TISS IMSTN DOC: NEGATIVE
REASON FOR STUDY: NORMAL
TEMPUS FUSIONADDENDUM: NORMAL
TEMPUS PD-L1 (22C3) COMBINED POSITIVE SCORE: <1
TEMPUS PD-L1 (22C3) TUMOR PROPORTION SCORE: <1 %
TEMPUS PORTAL: NORMAL
TEMPUS THERAPY1: NORMAL
TEMPUS THERAPY2: NORMAL
TEMPUS THERAPYCOUNT: 2
TEMPUS TRIAL1: NORMAL
TEMPUS TRIAL2: NORMAL
TEMPUS TRIAL3: NORMAL
TEMPUS TRIALCOUNT: 3

## 2024-01-28 PROBLEM — I73.9 PERIPHERAL VASCULAR DISEASE: Status: ACTIVE | Noted: 2024-01-28

## 2024-01-28 NOTE — PROGRESS NOTES
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 Anemia in neoplastic disease    3. Folate deficiency    4. Nicotine dependence, cigarettes, uncomplicated    5. Peripheral vascular disease    6. Palliative care encounter      Chief Complaint: Gastric Cancer      Oncologic History:      Oncologic History Gastric cancer - staging in process      Oncologic Treatment To be determined      Pathology 1/8/24:  GASTRIC MASS, BIOPSY:   Adenocarcinoma, with mucinous signet ring cell features.           Telemedicine visit:  The patient location is: home  The chief complaint leading to consultation is: gastric cancer    Visit type: audiovisual    Face to Face time with patient: 15 minutes  20 minutes of total time spent on the encounter, which includes face to face time and non-face to face time preparing to see the patient (eg, review of tests), Obtaining and/or reviewing separately obtained history, Documenting clinical information in the electronic or other health record, Independently interpreting results (not separately reported) and communicating results to the patient/family/caregiver, or Care coordination (not separately reported).     Each patient to whom he or she provides medical services by telemedicine is:  (1) informed of the relationship between the physician and patient and the respective role of any other health care provider with respect to management of the patient; and (2) notified that he or she may decline to receive medical services by telemedicine and may withdraw from such care at any time.    Notes: see below      Subjective:    History of Present Illness: Mr. Nance is a 73 y.o. male who presented in January 2024 for evaluation and management of gastric cancer. I had seen him during a hospitalization in January 2024.    - he was was admitted on 1/7/24 for hypotension/hemetemesis. Imaging revealed a possible  "gastroesophageal/gastric mass     - imaging (1/7/24) revealed a "large lesser curvature gastric ulcer with erosion into and inflammation of the adjacent pancreatic tail/pancreatitis."  - upper GI endoscopy (1/8/24) revealed "extrinsic narrowing of the GE junction attributed to malignant compression from a gastric adenocarcinoma. Large (5cm x 3cm), penetrating ulcer of the gastric cardia and proximal body which is typical for adenocarcinoma although lymphoma is also possible - biopsies obtained lesser curvature gastric ulcer with erosion into and inflammation of the adjacent pancreatic tail/pancreatitis."  - he was discharged with home hospice. He came today to understand what his treatment options are and decide on coming off hospice.      Interval history:  - he presents for a follow-up appointment for his gastric cancer.  - today he endorses worsened fatigue, weakness, weight loss, abdominal pain. He has decided not to proceed with therapy for his gastric cancer.    Past medical, surgical, family, and social histories have been reviewed and updated below.    Past Medical History: No past medical history on file.    Past Surgical History:   Past Surgical History:   Procedure Laterality Date    ESOPHAGOGASTRODUODENOSCOPY N/A 1/8/2024    Procedure: EGD (ESOPHAGOGASTRODUODENOSCOPY);  Surgeon: Alfredo Harper MD;  Location: [facility];  Service: Endoscopy;  Laterality: N/A;       Family History:   Family History   Family history unknown: Yes       Social History:  reports that he has been smoking cigarettes. He started smoking about 58 years ago. He has a 58.1 pack-year smoking history. He does not have any smokeless tobacco history on file. He reports that he does not currently use alcohol. He reports that he does not use drugs.    Allergies:  Review of patient's allergies indicates:  No Known Allergies    Medications:  Current Outpatient Medications   Medication Sig Dispense Refill    cyanocobalamin (VITAMIN B-12) " 1000 MCG tablet Take 1 tablet (1,000 mcg total) by mouth once daily. 30 tablet 2    FLUoxetine 10 MG capsule Take 1 capsule (10 mg total) by mouth once daily. 30 capsule 2    meclizine (ANTIVERT) 12.5 mg tablet Take 1 tablet By Mouth three times a day 30 tablet 0    midodrine (PROAMATINE) 10 MG tablet Take 1 tablet By Mouth three times a day 30 tablet 3    mirtazapine (REMERON) 7.5 MG Tab Take 1 tablet (7.5 mg total) by mouth every evening. 30 tablet 2    morphine (MS CONTIN) 15 MG 12 hr tablet Take 1 tablet (15 mg total) by mouth 2 (two) times daily. 20 tablet 0    nicotine (NICODERM CQ) 21 mg/24 hr Place 1 patch onto the skin once daily. (Patient not taking: Reported on 1/17/2024) 28 patch 1    pantoprazole (PROTONIX) 40 MG tablet Take 1 tablet (40 mg total) by mouth once daily. 30 tablet 2     No current facility-administered medications for this visit.       Review of Systems   Constitutional:  Positive for fatigue and unexpected weight change.   HENT:  Negative for sore throat.    Eyes:  Negative for visual disturbance.   Respiratory:  Positive for shortness of breath.    Cardiovascular:  Negative for chest pain.   Gastrointestinal:  Positive for abdominal pain and diarrhea.   Genitourinary:  Negative for dysuria.   Musculoskeletal:  Negative for back pain.   Skin:  Negative for rash.   Neurological:  Positive for dizziness and weakness. Negative for headaches.   Hematological:  Negative for adenopathy.   Psychiatric/Behavioral:  The patient is not nervous/anxious.        ECOG Performance Status:   ECOG SCORE    1 - Restricted in strenuous activity-ambulatory and able to carry out work of a light nature         Objective:      Vitals:   There were no vitals filed for this visit.    BMI: There is no height or weight on file to calculate BMI.  Deferred due to telemedicine visit.    Physical Exam  Deferred due to telemedicine visit.      Laboratory Data:  Labs have been reviewed.    Lab Results   Component Value  Date    WBC 9.14 01/17/2024    HGB 7.5 (L) 01/17/2024    HCT 23.3 (L) 01/17/2024    MCV 96 01/17/2024     01/17/2024               Diagnostic Results:  Upper GI endoscopy (1/8/24):   - Extrinsic narrowing of the GE junction attributed to malignant compression from a gastric adenocarcinoma   - Large (5cm x 3cm), penetrating ulcer of the gastric cardia and proximal body which is typical for adenocarcinoma although lymphoma is also possible - biopsies obtained      CT abdomen/pelvis (1/7/24): I have personally reviewed the images  Findings suggestive of a large lesser curvature gastric ulcer with erosion into and inflammation of the adjacent pancreatic tail/pancreatitis.  Blood products are suspected within the gastric lumen.     Nonspecific liquid large bowel contents     Abdominal aortic aneurysm        CTA chest (1/7/24): I have personally reviewed the images  No evidence for aortic dissection or pulmonary embolus.     Abnormal appearance of the stomach with abnormal hyperdensity noted and questionable air within the stomach walls.  Component of emphysematous gastritis not excluded.  Additionally, there is stranding of the fat along the posterior in the inferior margin of the stomach that extends towards the pancreas for which pancreatitis cannot be excluded.  Further evaluation with a dedicated CT scan of the abdomen and pelvis with IV and oral contrast material is recommended.     At least 2 indeterminate hypodensities are seen in the left hepatic lobe.  This can be better evaluated with CT scan of the abdomen.     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 Anemia in neoplastic disease    3. Folate deficiency    4. Nicotine dependence, cigarettes, uncomplicated    5. Peripheral vascular disease    6. Palliative care encounter           Plan:     Gastric cancer - stage IV / palliative care encounter  - I have reviewed his chart  - he was was admitted on 1/7/24 for  "hypotension/hemetemesis. Imaging revealed a possible gastroesophageal/gastric mass   - imaging (1/7/24) revealed a "large lesser curvature gastric ulcer with erosion into and inflammation of the adjacent pancreatic tail/pancreatitis."  - upper GI endoscopy (1/8/24) revealed "extrinsic narrowing of the GE junction attributed to malignant compression from a gastric adenocarcinoma. Large (5cm x 3cm), penetrating ulcer of the gastric cardia and proximal body which is typical for adenocarcinoma although lymphoma is also possible - biopsies obtained lesser curvature gastric ulcer with erosion into and inflammation of the adjacent pancreatic tail/pancreatitis."  - biopsy came back as "adenocarcinoma, with mucinous signet ring cell features."  - he was discharged with home hospice.   - after discussion with his family and further reflection, he has decided not to proceed with therapy for his gastric cancer.  - he will remain with home hospice at this time.  - return to clinic as needed.    2. Anemia in neoplastic disease / folate deficiency  - last hemoglobin was 7.5 g/dL  - folate (1/7/24) was decreased  - ferritin was 121 ng/mL  - he will remain with home hospice at this time.  - return to clinic as needed.    3. Nicotine dependence  - he has been smoking since the age of 12-13.  - I encouraged cessation.    4. Advance Care Planning     Power of   After our discussion (at initial visit), the patient decided to complete a HCPOA and appointed his  daughters Yana Nance ([phone]) and Carol Nance ([phone]) .        - return to clinic as needed.    Rl Weinberg M.D.  Hematology/Oncology  [facility] - [address], [address]  [place] [address]  Phone: [phone]  Fax: [phone]  "

## 2024-01-31 ENCOUNTER — TELEPHONE (OUTPATIENT)
Dept: PALLIATIVE MEDICINE | Facility: CLINIC | Age: 74
End: 2024-01-31
Payer: MEDICARE

## 2024-01-31 NOTE — TELEPHONE ENCOUNTER
Spoke with the patient daughter about her dad missed appointment on 1/30/2024. The patient has been rescheduled for February 20,2024 and the daughter verbalized understanding and a reminder letter was mailed out.

## 2024-01-31 NOTE — TELEPHONE ENCOUNTER
----- Message from Erma Gasca sent at 1/31/2024  2:43 PM CST -----  Type:  Appointment Request         Name of Caller:pt's daughter Danielle  When is the first available appointment?No access  Symptoms:reschedule appt  Would the patient rather a call back or a response via [facility]? call  Best Call Back Number:[phone]   Additional Information: Pt states unaware of appt scheduled for 01/30/24. Please call pt with further info and assistance rescheduling. Thank You.

## 2024-02-02 ENCOUNTER — OFFICE VISIT (OUTPATIENT)
Dept: HEMATOLOGY/ONCOLOGY | Facility: CLINIC | Age: 74
End: 2024-02-02
Payer: MEDICARE

## 2024-02-02 DIAGNOSIS — D63.0 ANEMIA IN NEOPLASTIC DISEASE: ICD-10-CM

## 2024-02-02 DIAGNOSIS — C16.0 MALIGNANT NEOPLASM OF CARDIA OF STOMACH: Primary | ICD-10-CM

## 2024-02-02 DIAGNOSIS — Z51.5 PALLIATIVE CARE ENCOUNTER: ICD-10-CM

## 2024-02-02 DIAGNOSIS — E53.8 FOLATE DEFICIENCY: ICD-10-CM

## 2024-02-02 DIAGNOSIS — I73.9 PERIPHERAL VASCULAR DISEASE: ICD-10-CM

## 2024-02-02 DIAGNOSIS — F17.210 NICOTINE DEPENDENCE, CIGARETTES, UNCOMPLICATED: ICD-10-CM

## 2024-02-02 PROCEDURE — 1157F ADVNC CARE PLAN IN RCRD: CPT | Mod: HCNC,CPTII,95, | Performed by: INTERNAL MEDICINE

## 2024-02-02 PROCEDURE — 1111F DSCHRG MED/CURRENT MED MERGE: CPT | Mod: HCNC,CPTII,95, | Performed by: INTERNAL MEDICINE

## 2024-02-02 PROCEDURE — 1160F RVW MEDS BY RX/DR IN RCRD: CPT | Mod: HCNC,CPTII,95, | Performed by: INTERNAL MEDICINE

## 2024-02-02 PROCEDURE — 3044F HG A1C LEVEL LT 7.0%: CPT | Mod: HCNC,CPTII,95, | Performed by: INTERNAL MEDICINE

## 2024-02-02 PROCEDURE — 99215 OFFICE O/P EST HI 40 MIN: CPT | Mod: HCNC,95,, | Performed by: INTERNAL MEDICINE

## 2024-02-02 PROCEDURE — 1159F MED LIST DOCD IN RCRD: CPT | Mod: HCNC,CPTII,95, | Performed by: INTERNAL MEDICINE

## 2024-02-05 ENCOUNTER — TELEPHONE (OUTPATIENT)
Dept: HEMATOLOGY/ONCOLOGY | Facility: CLINIC | Age: 74
End: 2024-02-05
Payer: MEDICARE

## 2024-02-05 NOTE — TELEPHONE ENCOUNTER
----- Message from Je Conte sent at 2/5/2024  9:49 AM CST -----  Contact: antolin nieves  Type: Sooner Appointment Request        Caller is requesting a sooner appointment. Caller declined first available appointment listed below. Caller will not accept being placed on the waitlist and is requesting a message be sent to doctor.        Name of Caller: Nely Benavides from [employer]  Best Call Back Number: [phone]   Information: [employer] test has been denied and [employer] will send info to the office give grievance  and appeals rights. Thanks

## 2024-04-08 PROBLEM — K92.0 HEMATEMESIS WITH NAUSEA: Status: RESOLVED | Noted: 2024-01-07 | Resolved: 2024-04-08

## 2024-04-15 PROBLEM — K25.0 ACUTE GASTRIC ULCER WITH HEMORRHAGE: Status: RESOLVED | Noted: 2024-01-08 | Resolved: 2024-04-15

## 2024-11-08 ENCOUNTER — HOSPITAL ENCOUNTER (EMERGENCY)
Facility: HOSPITAL | Age: 74
Discharge: HOME | End: 2024-11-08
Attending: EMERGENCY MEDICINE
Payer: MEDICARE

## 2024-11-08 VITALS
WEIGHT: 115.75 LBS | RESPIRATION RATE: 19 BRPM | BODY MASS INDEX: 16.2 KG/M2 | OXYGEN SATURATION: 98 % | HEART RATE: 78 BPM | SYSTOLIC BLOOD PRESSURE: 99 MMHG | HEIGHT: 71 IN | TEMPERATURE: 93 F | DIASTOLIC BLOOD PRESSURE: 40 MMHG

## 2024-11-08 DIAGNOSIS — G93.41 ACUTE METABOLIC ENCEPHALOPATHY: ICD-10-CM

## 2024-11-08 DIAGNOSIS — R10.2 PELVIC PAIN: ICD-10-CM

## 2024-11-08 DIAGNOSIS — N19 UREMIA: ICD-10-CM

## 2024-11-08 DIAGNOSIS — R41.82 ALTERED MENTAL STATUS: ICD-10-CM

## 2024-11-08 DIAGNOSIS — D64.9 ANEMIA, UNSPECIFIED TYPE: Primary | ICD-10-CM

## 2024-11-08 LAB
ALBUMIN SERPL BCP-MCNC: 2.8 G/DL (ref 3.5–5.2)
ALLENS TEST: ABNORMAL
ALP SERPL-CCNC: 47 U/L (ref 55–135)
ALT SERPL W/O P-5'-P-CCNC: 5 U/L (ref 10–44)
AMPHET+METHAMPHET UR QL: NEGATIVE
ANION GAP SERPL CALC-SCNC: 15 MMOL/L (ref 8–16)
APTT PPP: 30.2 SEC (ref 21–32)
AST SERPL-CCNC: 10 U/L (ref 10–40)
BACTERIA #/AREA URNS HPF: ABNORMAL /HPF
BARBITURATES UR QL SCN>200 NG/ML: NEGATIVE
BASOPHILS # BLD AUTO: 0.01 K/UL (ref 0–0.2)
BASOPHILS NFR BLD: 0.1 % (ref 0–1.9)
BENZODIAZ UR QL SCN>200 NG/ML: NEGATIVE
BILIRUB SERPL-MCNC: 0.5 MG/DL (ref 0.1–1)
BILIRUB UR QL STRIP: ABNORMAL
BUN SERPL-MCNC: 75 MG/DL (ref 8–23)
BZE UR QL SCN: NEGATIVE
CALCIUM SERPL-MCNC: 9 MG/DL (ref 8.7–10.5)
CANNABINOIDS UR QL SCN: NEGATIVE
CHLORIDE SERPL-SCNC: 100 MMOL/L (ref 95–110)
CK SERPL-CCNC: 100 U/L (ref 20–200)
CLARITY UR: ABNORMAL
CO2 SERPL-SCNC: 17 MMOL/L (ref 23–29)
COLOR UR: ABNORMAL
CREAT SERPL-MCNC: 1.3 MG/DL (ref 0.5–1.4)
CREAT UR-MCNC: 125.8 MG/DL (ref 23–375)
DELSYS: ABNORMAL
DIFFERENTIAL METHOD BLD: ABNORMAL
EOSINOPHIL # BLD AUTO: 0 K/UL (ref 0–0.5)
EOSINOPHIL NFR BLD: 0 % (ref 0–8)
ERYTHROCYTE [DISTWIDTH] IN BLOOD BY AUTOMATED COUNT: 26.4 % (ref 11.5–14.5)
EST. GFR  (NO RACE VARIABLE): 58.4 ML/MIN/1.73 M^2
GLUCOSE SERPL-MCNC: 177 MG/DL (ref 70–110)
GLUCOSE UR QL STRIP: NEGATIVE
HCO3 UR-SCNC: 14.9 MMOL/L (ref 24–28)
HCT VFR BLD AUTO: 22.7 % (ref 40–54)
HGB BLD-MCNC: 6.5 G/DL (ref 14–18)
HGB UR QL STRIP: NEGATIVE
HYALINE CASTS #/AREA URNS LPF: 4 /LPF
IMM GRANULOCYTES # BLD AUTO: 0.15 K/UL (ref 0–0.04)
IMM GRANULOCYTES NFR BLD AUTO: 1.2 % (ref 0–0.5)
INR PPP: 1.3 (ref 0.8–1.2)
KETONES UR QL STRIP: NEGATIVE
LEUKOCYTE ESTERASE UR QL STRIP: ABNORMAL
LYMPHOCYTES # BLD AUTO: 0.3 K/UL (ref 1–4.8)
LYMPHOCYTES NFR BLD: 2.1 % (ref 18–48)
MAGNESIUM SERPL-MCNC: 2 MG/DL (ref 1.6–2.6)
MCH RBC QN AUTO: 19.2 PG (ref 27–31)
MCHC RBC AUTO-ENTMCNC: 28.6 G/DL (ref 32–36)
MCV RBC AUTO: 67 FL (ref 82–98)
MICROSCOPIC COMMENT: ABNORMAL
MODE: ABNORMAL
MONOCYTES # BLD AUTO: 0.6 K/UL (ref 0.3–1)
MONOCYTES NFR BLD: 4.8 % (ref 4–15)
NEUTROPHILS # BLD AUTO: 12 K/UL (ref 1.8–7.7)
NEUTROPHILS NFR BLD: 91.8 % (ref 38–73)
NITRITE UR QL STRIP: NEGATIVE
NRBC BLD-RTO: 0 /100 WBC
OHS QRS DURATION: 92 MS
OHS QTC CALCULATION: 399 MS
OPIATES UR QL SCN: NEGATIVE
PCO2 BLDA: 26.9 MMHG (ref 35–45)
PCP UR QL SCN>25 NG/ML: NEGATIVE
PH SMN: 7.35 [PH] (ref 7.35–7.45)
PH UR STRIP: 5 [PH] (ref 5–8)
PLATELET # BLD AUTO: 190 K/UL (ref 150–450)
PMV BLD AUTO: 9.3 FL (ref 9.2–12.9)
PO2 BLDA: 115 MMHG (ref 80–100)
POC BE: -11 MMOL/L
POC SATURATED O2: 98 % (ref 95–100)
POC TCO2: 16 MMOL/L (ref 23–27)
POCT GLUCOSE: 182 MG/DL (ref 70–110)
POTASSIUM SERPL-SCNC: 5.2 MMOL/L (ref 3.5–5.1)
PROT SERPL-MCNC: 5.4 G/DL (ref 6–8.4)
PROT UR QL STRIP: ABNORMAL
PROTHROMBIN TIME: 14.6 SEC (ref 9–12.5)
RBC # BLD AUTO: 3.38 M/UL (ref 4.6–6.2)
RBC #/AREA URNS HPF: 13 /HPF (ref 0–4)
SAMPLE: ABNORMAL
SITE: ABNORMAL
SODIUM SERPL-SCNC: 132 MMOL/L (ref 136–145)
SP GR UR STRIP: 1.03 (ref 1–1.03)
SQUAMOUS #/AREA URNS HPF: 0 /HPF
TOXICOLOGY INFORMATION: NORMAL
TROPONIN I SERPL HS-MCNC: 16.1 PG/ML (ref 0–14.9)
TSH SERPL DL<=0.005 MIU/L-ACNC: 2.99 UIU/ML (ref 0.34–5.6)
UNIDENT CRYS URNS QL MICRO: 3
URN SPEC COLLECT METH UR: ABNORMAL
UROBILINOGEN UR STRIP-ACNC: ABNORMAL EU/DL
WBC # BLD AUTO: 13.01 K/UL (ref 3.9–12.7)
WBC #/AREA URNS HPF: 11 /HPF (ref 0–5)

## 2024-11-08 PROCEDURE — 25000003 PHARM REV CODE 250: Performed by: EMERGENCY MEDICINE

## 2024-11-08 PROCEDURE — 99285 EMERGENCY DEPT VISIT HI MDM: CPT | Mod: 25

## 2024-11-08 PROCEDURE — 80307 DRUG TEST PRSMV CHEM ANLYZR: CPT | Performed by: EMERGENCY MEDICINE

## 2024-11-08 PROCEDURE — 84443 ASSAY THYROID STIM HORMONE: CPT | Performed by: EMERGENCY MEDICINE

## 2024-11-08 PROCEDURE — 99900035 HC TECH TIME PER 15 MIN (STAT)

## 2024-11-08 PROCEDURE — 82803 BLOOD GASES ANY COMBINATION: CPT

## 2024-11-08 PROCEDURE — 85610 PROTHROMBIN TIME: CPT | Performed by: EMERGENCY MEDICINE

## 2024-11-08 PROCEDURE — 84484 ASSAY OF TROPONIN QUANT: CPT | Performed by: EMERGENCY MEDICINE

## 2024-11-08 PROCEDURE — 80053 COMPREHEN METABOLIC PANEL: CPT | Performed by: EMERGENCY MEDICINE

## 2024-11-08 PROCEDURE — 36600 WITHDRAWAL OF ARTERIAL BLOOD: CPT

## 2024-11-08 PROCEDURE — 82550 ASSAY OF CK (CPK): CPT | Performed by: EMERGENCY MEDICINE

## 2024-11-08 PROCEDURE — 96360 HYDRATION IV INFUSION INIT: CPT

## 2024-11-08 PROCEDURE — 85025 COMPLETE CBC W/AUTO DIFF WBC: CPT | Performed by: EMERGENCY MEDICINE

## 2024-11-08 PROCEDURE — 82962 GLUCOSE BLOOD TEST: CPT

## 2024-11-08 PROCEDURE — 87086 URINE CULTURE/COLONY COUNT: CPT | Performed by: EMERGENCY MEDICINE

## 2024-11-08 PROCEDURE — 85730 THROMBOPLASTIN TIME PARTIAL: CPT | Performed by: EMERGENCY MEDICINE

## 2024-11-08 PROCEDURE — 81001 URINALYSIS AUTO W/SCOPE: CPT | Performed by: EMERGENCY MEDICINE

## 2024-11-08 PROCEDURE — 36415 COLL VENOUS BLD VENIPUNCTURE: CPT | Performed by: EMERGENCY MEDICINE

## 2024-11-08 PROCEDURE — 83735 ASSAY OF MAGNESIUM: CPT | Performed by: EMERGENCY MEDICINE

## 2024-11-08 RX ADMIN — SODIUM CHLORIDE 1575 ML: 9 INJECTION, SOLUTION INTRAVENOUS at 09:11

## 2024-11-08 NOTE — PLAN OF CARE
11/08/24 1228   Discharge Reassessment   Assessment Type Discharge Planning Reassessment   Did the patient's condition or plan change since previous assessment? Yes   Discharge Plan A Home with family   Discharge Plan B Home   Post-Acute Status   Post-Acute Authorization Hospice   Discharge Delays (!) Ambulance Transport/Facility Transport     CM spoke with Chas ([facility]) about the discharge plan and was assured that a nurse would make regular visits to the patient until he was accepted into the [facility]. The Pt's son and sister-in-law decided to allow him to come home while awaiting placement, according to Chas.

## 2024-11-11 LAB — BACTERIA UR CULT: NO GROWTH
